# Patient Record
Sex: MALE | Race: WHITE
[De-identification: names, ages, dates, MRNs, and addresses within clinical notes are randomized per-mention and may not be internally consistent; named-entity substitution may affect disease eponyms.]

---

## 2017-11-13 ENCOUNTER — HOSPITAL ENCOUNTER (EMERGENCY)
Dept: HOSPITAL 75 - ER | Age: 82
LOS: 1 days | Discharge: HOME | End: 2017-11-14
Payer: MEDICARE

## 2017-11-13 VITALS — WEIGHT: 145 LBS | HEIGHT: 67 IN | BODY MASS INDEX: 22.76 KG/M2

## 2017-11-13 DIAGNOSIS — I88.0: Primary | ICD-10-CM

## 2017-11-13 DIAGNOSIS — M19.90: ICD-10-CM

## 2017-11-13 DIAGNOSIS — Z98.1: ICD-10-CM

## 2017-11-13 DIAGNOSIS — Z87.891: ICD-10-CM

## 2017-11-13 DIAGNOSIS — E11.9: ICD-10-CM

## 2017-11-13 DIAGNOSIS — N40.0: ICD-10-CM

## 2017-11-13 LAB
BASOPHILS # BLD AUTO: 0.1 10^3/UL (ref 0–0.1)
BASOPHILS NFR BLD AUTO: 1 % (ref 0–10)
EOSINOPHIL # BLD AUTO: 0.3 10^3/UL (ref 0–0.3)
EOSINOPHIL NFR BLD AUTO: 5 % (ref 0–10)
ERYTHROCYTE [DISTWIDTH] IN BLOOD BY AUTOMATED COUNT: 11.7 % (ref 10–14.5)
LYMPHOCYTES # BLD AUTO: 2.3 X 10^3 (ref 1–4)
LYMPHOCYTES NFR BLD AUTO: 37 % (ref 12–44)
MCH RBC QN AUTO: 34 PG (ref 25–34)
MCHC RBC AUTO-ENTMCNC: 35 G/DL (ref 32–36)
MCV RBC AUTO: 97 FL (ref 80–99)
MONOCYTES # BLD AUTO: 0.8 X 10^3 (ref 0–1)
MONOCYTES NFR BLD AUTO: 13 % (ref 0–12)
NEUTROPHILS # BLD AUTO: 2.8 X 10^3 (ref 1.8–7.8)
NEUTROPHILS NFR BLD AUTO: 44 % (ref 42–75)
PLATELET # BLD: 191 10^3/UL (ref 130–400)
PMV BLD AUTO: 10.1 FL (ref 7.4–10.4)
RBC # BLD AUTO: 4.03 10^6/UL (ref 4.35–5.85)
WBC # BLD AUTO: 6.2 10^3/UL (ref 4.3–11)

## 2017-11-13 PROCEDURE — 36415 COLL VENOUS BLD VENIPUNCTURE: CPT

## 2017-11-13 PROCEDURE — 80053 COMPREHEN METABOLIC PANEL: CPT

## 2017-11-13 PROCEDURE — 74176 CT ABD & PELVIS W/O CONTRAST: CPT

## 2017-11-13 PROCEDURE — 87449 NOS EACH ORGANISM AG IA: CPT

## 2017-11-13 PROCEDURE — 83690 ASSAY OF LIPASE: CPT

## 2017-11-13 PROCEDURE — 87046 STOOL CULTR AEROBIC BACT EA: CPT

## 2017-11-13 PROCEDURE — 85025 COMPLETE CBC W/AUTO DIFF WBC: CPT

## 2017-11-13 PROCEDURE — 89055 LEUKOCYTE ASSESSMENT FECAL: CPT

## 2017-11-13 PROCEDURE — 81000 URINALYSIS NONAUTO W/SCOPE: CPT

## 2017-11-13 PROCEDURE — 87324 CLOSTRIDIUM AG IA: CPT

## 2017-11-13 PROCEDURE — 87045 FECES CULTURE AEROBIC BACT: CPT

## 2017-11-13 PROCEDURE — 74022 RADEX COMPL AQT ABD SERIES: CPT

## 2017-11-13 PROCEDURE — 82150 ASSAY OF AMYLASE: CPT

## 2017-11-14 VITALS — DIASTOLIC BLOOD PRESSURE: 85 MMHG | SYSTOLIC BLOOD PRESSURE: 151 MMHG

## 2017-11-14 LAB
ALBUMIN SERPL-MCNC: 3.7 GM/DL (ref 3.2–4.5)
ALT SERPL-CCNC: 23 U/L (ref 0–55)
AMYLASE SERPL-CCNC: 93 U/L (ref 25–125)
ANION GAP SERPL CALC-SCNC: 10 MMOL/L (ref 5–14)
AST SERPL-CCNC: 29 U/L (ref 5–34)
BILIRUB SERPL-MCNC: 0.3 MG/DL (ref 0.1–1)
BILIRUB UR QL STRIP: NEGATIVE
BUN SERPL-MCNC: 22 MG/DL (ref 7–18)
BUN/CREAT SERPL: 20
CALCIUM SERPL-MCNC: 9.1 MG/DL (ref 8.5–10.1)
CAOX CRY #/AREA URNS LPF: (no result) /LPF
CHLORIDE SERPL-SCNC: 109 MMOL/L (ref 98–107)
CO2 SERPL-SCNC: 22 MMOL/L (ref 21–32)
CREAT SERPL-MCNC: 1.11 MG/DL (ref 0.6–1.3)
GFR SERPLBLD BASED ON 1.73 SQ M-ARVRAT: > 60 ML/MIN
GLUCOSE SERPL-MCNC: 116 MG/DL (ref 70–105)
KETONES UR QL STRIP: (no result)
LEUKOCYTE ESTERASE UR QL STRIP: (no result)
LIPASE SERPL-CCNC: 37 U/L (ref 8–78)
NITRITE UR QL STRIP: NEGATIVE
PH UR STRIP: 5 [PH] (ref 5–9)
POTASSIUM SERPL-SCNC: 3.4 MMOL/L (ref 3.6–5)
PROT SERPL-MCNC: 6.2 GM/DL (ref 6.4–8.2)
PROT UR QL STRIP: (no result)
SODIUM SERPL-SCNC: 141 MMOL/L (ref 135–145)
SP GR UR STRIP: 1.03 (ref 1.02–1.02)
SQUAMOUS #/AREA URNS HPF: (no result) /HPF
UROBILINOGEN UR-MCNC: NORMAL MG/DL
WBC #/AREA URNS HPF: (no result) /HPF

## 2017-11-14 NOTE — DIAGNOSTIC IMAGING REPORT
PROCEDURE: CT abdomen and pelvis without contrast.



TECHNIQUE: Multiple contiguous axial images were obtained through

the abdomen and pelvis without the use of intravenous contrast. 



INDICATION: Diarrhea. Abdominal pain. 



COMPARISON: CT abdomen and pelvis with IV contrast 10/23/2013.



FINDINGS: Mild linear atelectasis or scarring in the lung bases.

Cholecystectomy. Enlarged prostate containing calcifications.

Advanced colonic diverticulosis. No evidence of active

diverticulitis. Nonspecific prominent lymph nodes in the central

mesentery with mild stranding. This is similar to the 10/23/2013

exam. Small esophageal hiatal hernia. The liver, pancreas,

spleen, adrenals, kidneys, collecting systems and bladder are

unremarkable on this noncontrast exam. Negative appendix.

Moderate atherosclerotic calcifications including a normal

caliber abdominal aorta. Small fat containing umbilical hernia.

Moderate spondylotic changes in the visualized spine. No acute

osseous findings.



IMPRESSION: 

1. No acute CT findings in the abdomen or pelvis.

2. Advanced colonic diverticulosis without evidence of active

diverticulitis.

3. Melodie mesentery and nonspecific prominent lymph nodes in the

central mesentery has not appreciably changed since the 2013 exam

and is likely incidental.

4. Markedly enlarged prostate.



Dictated by: 



  Dictated on workstation # [id number]

## 2017-11-14 NOTE — DIAGNOSTIC IMAGING REPORT
EXAM: ACUTE ABD SERIES



INDICATION: Diarrhea. Abdominal pain and cramping.



COMPARISON: Chest radiograph 6/30/2015.    



FINDINGS: Normal heart size and pulmonary vascularity. Calcified

aorta. No dense consolidation, pleural effusion or pneumothorax.

Postoperative changes in the shoulders. No free intraperitoneal

air. Nonspecific bowel gas pattern. No acute osseous findings.



IMPRESSION: No acute radiographic findings in the chest or

abdomen.



Dictated by: 



  Dictated on workstation # [id number]

## 2017-11-14 NOTE — ED GI
General


Chief Complaint:  Abdominal/GI Problems


Stated Complaint:  DIARRHEA


Nursing Triage Note:  


PT STATES HE HAS HAD DIARRHEA X 3 WEEKS. WITH INTERMITTENT ABD PAIN AND 


CRAMPING. PT IS ON ANTIBIOTIC RIGHT NOW FOR COUGH.


Sepsis Screen:  No Definite Risk


Source of Information:  Patient





History of Present Illness


Time Seen By Provider:  23:35


Initial Comments


PT ARRIVES VIA POV FROM HOME


C/O DIARRHEA X 3 WEEKS--STATES HE HAS HAD DIARRHEA X 15 SINCE 1900 TONIGHT


HAS MILD LOWER ABDOMINAL CRAMPING IMMEDIATELY PRIOR TO HAVING DIARRHEA, 

OTHERWISE NO ABDOMINAL PAIN


NO NAUSEA/VOMITING OR CHANGE IN APPETITE


NO FEVER


NO URINARY SYMPTOMS AND VOIDING A NORMAL AMOUNT





PT HAD RIGHT SHOULDER SURGERY 3 WEEKS AGO BY DR. REYNOSO IN [place]


PT STATES HE BEGAN HAVING A COUGH/ COLD SYMPTOMS PRIOR TO SURGERY, AND WAS 

PRESCRIBED AMOXIL BY DR. NEELY'S OFFICE


DIARRHEA BEGAN 3 DAYS AFTER SURGERY--DID NOT HAVE A BM AT ALL FOR THE FIRST 3 

DAYS FOLLOWING SURGERY


COUGH HAS PERSISTED AND WAS SEEN AT URGENT CARE/DR. NEELY'S OFFICE 10 DAYS AGO 

AND WAS PRESCRIBED A "REALLY STRONG" ANTIBIOTIC THAT HE TAKES ONCE A DAY. HAS 5 

DAYS LEFT, AND  MISSED ONE DAY. DOES NOT KNOW NAME OF ANTIBIOTIC. STATES COUGH 

IS GETTING BETTER


STATES OVER A WEEK AGO, HE HAD DIARRHEA CONTINUOUSLY FROM 1900 TO 0300, THEN 

WAS FINE FOR A FEW DAYS, WITH A COUPLE OF MORE NORMAL STOOLS, THEN DIARRHEA 

STARTED AGAIN A COUPLE OF DAYS AGO. 





HAS NOT SOUGHT CARE FOR THE DIARRHEA AT ANY TIME


SYMPTOMS ARE NOT ANY DIFFERENT TODAY THAN WHAT HE HAS BEEN EXPERIENCING--NO 

BLOODY OR TARRY STOOLS





PCP: DR. NEELY





Allergies and Home Medications


Allergies


Uncoded Allergies:  


     BEE STING (Allergy, Intermediate, SWELLING OF FACE, 10/2/11)





Home Medications


Ascorbate Calcium 500 Mg Tablet, 1,000 MG PO DAILY, (Reported)


Ciprofloxacin HCl 500 Mg Tablet, 500 MG PO BID, #20


   Prescribed by: GONZALEZ ECKERT on 11/14/17 0151


Lactobacillus Acidophilus 1 Each Capsule, 2 EACH PO QID, #80


   Prescribed by: GONZALEZ ECKERT on 11/14/17 0151


Metronidazole 500 Mg Tablet, 500 MG PO QID, #40


   Prescribed by: GONZALEZ ECKERT on 11/14/17 0151


Mu-Vits-Min Th/Lycopene/Lutein 1 Each Tablet, 1 EACH PO DAILY, (Reported)


[Potassium]  , PRN, (Reported)





Review of Systems


Constitutional:  no symptoms reported


EENTM:  See HPI, Nose Congestion


Respiratory:  Cough


Cardiovascular:  No Symptoms Reported


Gastrointestinal:  See HPI, Diarrhea, Denies Nausea, Denies Poor Appetite, 

Denies Poor Fluid Intake, Denies Rectal Bleeding, Denies Vomiting


Genitourinary:  No Symptoms Reported


Musculoskeletal:  see HPI


Skin:  no symptoms reported


Psychiatric/Neurological:  No Symptoms Reported


Endocrine:  No Symptoms Reported


Hematologic/Lymphatic:  No Symptoms Reported





Past Medical-Social-Family Hx


Patient Social History


Alcohol Use:  Denies Use


Recreational Drug Use:  No


Smoking Status:  Former Smoker (QUIT AGE 10)


Recent Foreign Travel:  No


Contact w/Someone Who Travel:  No


Recent Infectious Disease Expo:  No





Immunizations Up To Date


Tetanus Booster (TDap):  Unknown


Date of Pneumonia Vaccine:  Oct 1, 2010


Date of Influenza Vaccine:  Oct 7, 2013





Surgeries


History of Surgeries:  Yes (LIPOMA LEFT LEG; BILATERAL KNEE SURGERIES; 

BILATERAL FOOT SURGERIES; BILATERAL SHOULDER SURGERIES; BACK AND NECK SURGERIES)


Surgeries:  Gallbladder, Orthopedic





Respiratory


History of Respiratory Disorde:  No





Cardiovascular


History of Cardiac Disorders:  No





Neurological


History of Neurological Disord:  No





Reproductive System


Hx Reproductive Disorders:  No





Genitourinary


History of Genitourinary Disor:  Yes


Genitourinary Disorders:  Benign Prostatic Hyperpl





Gastrointestinal


History of Gastrointestinal Di:  No





Musculoskeletal


History of Musculoskeletal Dis:  Yes (bilateral rotator cuff repair, bilateral 

meniscus repair, C7 fusion; BACK SURGERY)


Musculoskeletal Disorders:  Arthritis





Endocrine


History of Endocrine Disorders:  Yes (DIET CONTORLLED DIABETES)


Endocrine Disorders:  Diabetes, Non-Insulin dep





HEENT


History of HEENT Disorders:  No





Cancer


History of Cancer:  No





Psychosocial


History of Psychiatric Problem:  No





Integumentary


History of Skin or Integumenta:  No





Blood Transfusions


History of Blood Disorders:  No


Adverse Reaction to a Blood Tr:  No





Family Medical History


Significant Family History:  No Pertinent Family Hx





Physical Exam


Vital Signs





 VS - Last 72 Hours, by Label








 11/13/17





 23:24


 


Temp 98.2


 


Pulse 73


 


Resp 16


 


B/P (MAP) 159/83


 


Pulse Ox 96





Capillary Refill : Less Than 3 Seconds


General Appearance:  WD/WN, no apparent distress, other (LEFT ARM IN SLING)


HEENT:  PERRL/EOMI, normal ENT inspection, other (ORAL MUCOSA MOIST)


Respiratory:  normal breath sounds, no respiratory distress, no accessory 

muscle use


Cardiovascular:  normal peripheral pulses, regular rate, rhythm, no murmur


Gastrointestinal:  normal bowel sounds, non tender, soft, no organomegaly


Extremities:  normal inspection, normal capillary refill


Back:  no CVA tenderness


Neurologic/Psychiatric:  CNs II-XII nml as tested, no motor/sensory deficits, 

alert, normal mood/affect, oriented x 3


Skin:  normal color, warm/dry





Progress/Results/Core Measures


Results/Orders


Lab Results





Laboratory Tests








Test


  11/13/17


23:54 11/13/17


23:58 Range/Units


 


 


White Blood Count


  6.2 


  


  4.3-11.0


10^3/uL


 


Red Blood Count


  4.03 L


  


  4.35-5.85


10^6/uL


 


Hemoglobin 13.6   13.3-17.7  G/DL


 


Hematocrit 39 L  40-54  %


 


Mean Corpuscular Volume 97   80-99  FL


 


Mean Corpuscular Hemoglobin 34   25-34  PG


 


Mean Corpuscular Hemoglobin


Concent 35 


  


  32-36  G/DL


 


 


Red Cell Distribution Width 11.7   10.0-14.5  %


 


Platelet Count


  191 


  


  130-400


10^3/uL


 


Mean Platelet Volume 10.1   7.4-10.4  FL


 


Neutrophils (%) (Auto) 44   42-75  %


 


Lymphocytes (%) (Auto) 37   12-44  %


 


Monocytes (%) (Auto) 13 H  0-12  %


 


Eosinophils (%) (Auto) 5   0-10  %


 


Basophils (%) (Auto) 1   0-10  %


 


Neutrophils # (Auto) 2.8   1.8-7.8  X 10^3


 


Lymphocytes # (Auto) 2.3   1.0-4.0  X 10^3


 


Monocytes # (Auto) 0.8   0.0-1.0  X 10^3


 


Eosinophils # (Auto)


  0.3 


  


  0.0-0.3


10^3/uL


 


Basophils # (Auto)


  0.1 


  


  0.0-0.1


10^3/uL


 


Sodium Level 141   135-145  MMOL/L


 


Potassium Level 3.4 L  3.6-5.0  MMOL/L


 


Chloride Level 109 H    MMOL/L


 


Carbon Dioxide Level 22   21-32  MMOL/L


 


Anion Gap 10   5-14  MMOL/L


 


Blood Urea Nitrogen 22 H  7-18  MG/DL


 


Creatinine


  1.11 


  


  0.60-1.30


MG/DL


 


Estimat Glomerular Filtration


Rate > 60 


  


   


 


 


BUN/Creatinine Ratio 20    


 


Glucose Level 116 H    MG/DL


 


Calcium Level 9.1   8.5-10.1  MG/DL


 


Total Bilirubin 0.3   0.1-1.0  MG/DL


 


Aspartate Amino Transf


(AST/SGOT) 29 


  


  5-34  U/L


 


 


Alanine Aminotransferase


(ALT/SGPT) 23 


  


  0-55  U/L


 


 


Alkaline Phosphatase 103     U/L


 


Total Protein 6.2 L  6.4-8.2  GM/DL


 


Albumin 3.7   3.2-4.5  GM/DL


 


Amylase Level 93     U/L


 


Lipase 37   8-78  U/L


 


Urine Color  YELLOW   


 


Urine Clarity  CLEAR   


 


Urine pH  5  5-9  


 


Urine Specific Gravity  1.030 H 1.016-1.022  


 


Urine Protein  1+ H NEGATIVE  


 


Urine Glucose (UA)  NEGATIVE  NEGATIVE  


 


Urine Ketones  1+ H NEGATIVE  


 


Urine Nitrite  NEGATIVE  NEGATIVE  


 


Urine Bilirubin  NEGATIVE  NEGATIVE  


 


Urine Urobilinogen  NORMAL  NORMAL  MG/DL


 


Urine Leukocyte Esterase  3+ H NEGATIVE  


 


Urine RBC (Auto)  NEGATIVE  NEGATIVE  


 


Urine RBC  NONE   /HPF


 


Urine WBC  0-2   /HPF


 


Urine Squamous Epithelial


Cells 


  0-2 


   /HPF


 


 


Urine Crystals  PRESENT H  /LPF


 


Urine Calcium Oxalate Crystals  FEW H  /LPF


 


Urine Bacteria  TRACE   /HPF


 


Urine Casts  NONE   /LPF


 


Urine Mucus  MODERATE H  /LPF


 


Urine Culture Indicated  NO   








My Orders





Orders - GONZALEZ ECKERT DO


Saline Lock/Iv-Start (11/13/17 23:34)


Amylase (11/13/17 23:34)


Cbc With Automated Diff (11/13/17 23:34)


Comprehensive Metabolic Panel (11/13/17 23:34)


Lipase (11/13/17 23:34)


Ua Culture If Indicated (11/13/17 23:34)


Saline Lock/Iv-Start (11/13/17 23:34)


Lactated Ringers (Lr 1000 Ml Iv Solution (11/13/17 23:34)


Ct Abdomen/Pelvis Wo (11/14/17 00:01)


Acute Abd Series (11/14/17 00:01)


Levofloxacin  Tablet (Levaquin  Tablet) (11/14/17 00:45)


Metronidazole 500mg/100ml Ivpb (Flagyl 5 (11/14/17 00:45)


Stool Culture (11/14/17 01:25)


Fecal Wbc (11/14/17 01:25)


C Difficile Ag + Toxin A/B. (11/14/17 01:25)





Medications Given in ED





Current Medications








 Medications  Dose


 Ordered  Sig/Sean


 Route  Start Time


 Stop Time Status Last Admin


Dose Admin


 


 Lactated Ringer's  1,000 ml @ 


 0 mls/hr  Q0M ONCE


 IV  11/13/17 23:34


 11/13/17 23:35 DC 11/13/17 23:56


1,000 MLS/HR


 


 Levofloxacin  500 mg  ONCE  ONCE


 PO  11/14/17 00:45


 11/14/17 00:46 DC 11/14/17 00:49


500 MG


 


 Metronidazole  100 ml @ 


 100 mls/hr  ONCE  ONCE


 IV  11/14/17 00:45


 11/14/17 01:44 DC 11/14/17 00:49


100 MLS/HR








Vital Signs/I&O





Vital Sign - Last 12Hours








 11/13/17





 23:24


 


Temp 98.2


 


Pulse 73


 


Resp 16


 


B/P (MAP) 159/83


 


Pulse Ox 96














Blood Pressure Mean:  108








Progress Note :  


Progress Note


NO PAIN DURING ER STAY


DID HAVE 1 STOOL DURING STAY--SENT TO LAB FOR STUDIES





Diagnostic Imaging





Comments


CT ABDOMEN/PELVIS--DIVERTICULAR DISEASE WITHOUT ACUTE DIVERTICULITIS, NON-

SPECIFIC MESENTERIC LYMPH NODES. NO BOWEL MUCOSAL THICKENING, ENLARGED PROSTATE

, SMALL NIKIA-UMBILICAL HERNIA/FAT CONTAINING--PER [facility] VIA FAX @ 1582


   Reviewed:  Reviewed by Me





Departure


Impression


Impression:  


 Primary Impression:  


 Diarrhea


 Additional Impressions:  


 Mesenteric adenitis


 POSSIBLE ANTIBIOTIC ASSOCIATED DIARRHEA/C.DIFFICILE


Disposition:  01 HOME, SELF-CARE


Condition:  Improved





Departure-Patient Inst.


Referrals:  


MAGALY NEELY MD (PCP/Family)


Primary Care Physician


Patient Instructions:  Diarrhea in Adolescents and Adults, GASTROENTERITIS-6Y-

ADULT, Mesenteric Lymphadenitis (DC)





Add. Discharge Instructions:  


CLEAR LIQUIDS--WATER, BROTH, JELLO, GATORADE





BRATS DIET--BANANAS, RICE, APPLESAUCE, TOAST, SALTINES





FOLLOW UP WITH YOUR DR THIS WEEK FOR FURTHER CARE





All discharge instructions reviewed with patient and/or family. Voiced 

understanding.


Scripts


Lactobacillus Acidophilus (Acidophilus) 1 Each Capsule


2 EACH PO QID, #80 CAP


   Prov: GONZALEZ ECKERT DO         11/14/17 


Metronidazole (Flagyl) 500 Mg Tablet


500 MG PO QID for FOR INFECTION, #40 TAB


   Prov: TOMEKASOLOMONA K DO         11/14/17 


Ciprofloxacin HCl (Cipro) 500 Mg Tablet


500 MG PO BID, #20 TAB


   Prov: GONZALEZ ECKERT DO         11/14/17











GONZALEZ ECKERT DO Nov 14, 2017 00:55

## 2018-09-08 ENCOUNTER — HOSPITAL ENCOUNTER (EMERGENCY)
Dept: HOSPITAL 75 - ER | Age: 83
LOS: 1 days | Discharge: LEFT BEFORE DISCHARGE | End: 2018-09-09
Payer: MEDICARE

## 2018-09-08 ENCOUNTER — HOSPITAL ENCOUNTER (EMERGENCY)
Dept: HOSPITAL 75 - ER | Age: 83
Discharge: HOME | End: 2018-09-08
Payer: MEDICARE

## 2018-09-08 VITALS — DIASTOLIC BLOOD PRESSURE: 68 MMHG | SYSTOLIC BLOOD PRESSURE: 134 MMHG

## 2018-09-08 VITALS — BODY MASS INDEX: 21.98 KG/M2 | WEIGHT: 145 LBS | HEIGHT: 68 IN

## 2018-09-08 VITALS — DIASTOLIC BLOOD PRESSURE: 61 MMHG | SYSTOLIC BLOOD PRESSURE: 131 MMHG

## 2018-09-08 DIAGNOSIS — Z90.49: ICD-10-CM

## 2018-09-08 DIAGNOSIS — R10.9: Primary | ICD-10-CM

## 2018-09-08 DIAGNOSIS — Z87.448: ICD-10-CM

## 2018-09-08 DIAGNOSIS — R10.33: ICD-10-CM

## 2018-09-08 DIAGNOSIS — K59.00: Primary | ICD-10-CM

## 2018-09-08 DIAGNOSIS — E11.9: ICD-10-CM

## 2018-09-08 DIAGNOSIS — R07.9: ICD-10-CM

## 2018-09-08 LAB
ALBUMIN SERPL-MCNC: 3.7 GM/DL (ref 3.2–4.5)
ALP SERPL-CCNC: 81 U/L (ref 40–136)
ALT SERPL-CCNC: 13 U/L (ref 0–55)
BASOPHILS # BLD AUTO: 0 10^3/UL (ref 0–0.1)
BASOPHILS NFR BLD AUTO: 0 % (ref 0–10)
BILIRUB SERPL-MCNC: 0.8 MG/DL (ref 0.1–1)
BUN/CREAT SERPL: 26
CALCIUM SERPL-MCNC: 8.8 MG/DL (ref 8.5–10.1)
CHLORIDE SERPL-SCNC: 106 MMOL/L (ref 98–107)
CO2 SERPL-SCNC: 21 MMOL/L (ref 21–32)
CREAT SERPL-MCNC: 1.1 MG/DL (ref 0.6–1.3)
EOSINOPHIL # BLD AUTO: 0.3 10^3/UL (ref 0–0.3)
EOSINOPHIL NFR BLD AUTO: 3 % (ref 0–10)
ERYTHROCYTE [DISTWIDTH] IN BLOOD BY AUTOMATED COUNT: 12.2 % (ref 10–14.5)
GFR SERPLBLD BASED ON 1.73 SQ M-ARVRAT: > 60 ML/MIN
GLUCOSE SERPL-MCNC: 272 MG/DL (ref 70–105)
HCT VFR BLD CALC: 40 % (ref 40–54)
HGB BLD-MCNC: 14 G/DL (ref 13.3–17.7)
LYMPHOCYTES # BLD AUTO: 1.2 X 10^3 (ref 1–4)
LYMPHOCYTES NFR BLD AUTO: 12 % (ref 12–44)
MANUAL DIFFERENTIAL PERFORMED BLD QL: NO
MCH RBC QN AUTO: 34 PG (ref 25–34)
MCHC RBC AUTO-ENTMCNC: 35 G/DL (ref 32–36)
MCV RBC AUTO: 100 FL (ref 80–99)
MONOCYTES # BLD AUTO: 0.6 X 10^3 (ref 0–1)
MONOCYTES NFR BLD AUTO: 6 % (ref 0–12)
NEUTROPHILS # BLD AUTO: 7.8 X 10^3 (ref 1.8–7.8)
NEUTROPHILS NFR BLD AUTO: 79 % (ref 42–75)
PLATELET # BLD: 195 10^3/UL (ref 130–400)
PMV BLD AUTO: 11 FL (ref 7.4–10.4)
POTASSIUM SERPL-SCNC: 3.9 MMOL/L (ref 3.6–5)
PROT SERPL-MCNC: 6.1 GM/DL (ref 6.4–8.2)
RBC # BLD AUTO: 4.06 10^6/UL (ref 4.35–5.85)
SODIUM SERPL-SCNC: 137 MMOL/L (ref 135–145)
WBC # BLD AUTO: 9.9 10^3/UL (ref 4.3–11)

## 2018-09-08 PROCEDURE — 74177 CT ABD & PELVIS W/CONTRAST: CPT

## 2018-09-08 PROCEDURE — 80053 COMPREHEN METABOLIC PANEL: CPT

## 2018-09-08 PROCEDURE — 85025 COMPLETE CBC W/AUTO DIFF WBC: CPT

## 2018-09-08 PROCEDURE — 36415 COLL VENOUS BLD VENIPUNCTURE: CPT

## 2018-09-08 PROCEDURE — 93005 ELECTROCARDIOGRAM TRACING: CPT

## 2018-09-08 PROCEDURE — 84484 ASSAY OF TROPONIN QUANT: CPT

## 2018-09-08 PROCEDURE — 71045 X-RAY EXAM CHEST 1 VIEW: CPT

## 2018-09-08 NOTE — ED ABDOMINAL PAIN
General


Chief Complaint:  Abdominal/GI Problems


Stated Complaint:  SHARP ABD PAIN,MOVING TOWARDS CHEST,HEADACHE


Nursing Triage Note:  


ARRIVED VIA AMB TO ROOM 07.  STATES YESTERDAY HE STARTED HAVING SHOOTING ABD 


PAIN THAT IS NOW RADIATING INTO CHEST THIS AM.


Sepsis Screen:  No Definite Risk


Source of Information:  Patient


Exam Limitations:  No Limitations





History of Present Illness


Date Seen by Provider:  Sep 8, 2018


Time Seen by Provider:  11:39


Initial Comments


The patient is an 86-year-old white male known to me.  He reports that 

yesterday afternoon he began having a rather sharp pain in his abdomen between 

the umbilicus and the xiphoid process.  There was discomfort and he forced 

belching through the night.  He normally has a morning bowel movement but has 

not this morning.  The pain has seemed to move upward some what into the low 

chest.  There is no radiation to the shoulders or the scapulae.  He has had a 

previous cholecystectomy.  There has been some nausea but he has not felt as if 

he needed to vomit.  He ate some breakfast this morning but reports that may 

have been a mistake.


Timing/Duration:  12-24 Hours


Severity/Quality:  Mild, Moderate


Location:  RUQ, Periumbilical


Radiation:  Chest





Allergies and Home Medications


Allergies


Uncoded Allergies:  


     BEE STING (Allergy, Intermediate, SWELLING OF FACE, 10/2/11)





Home Medications


Ascorbate Calcium 500 Mg Tablet, 1,000 MG PO DAILY, (Reported)


Ciprofloxacin HCl 500 Mg Tablet, 500 MG PO BID


   Prescribed by: GONZALEZ ECKERT on 11/14/17 0151


Lactobacillus Acidophilus 1 Each Capsule, 2 EACH PO QID


   Prescribed by: GONZALEZ ECKERT on 11/14/17 0151


Metronidazole 500 Mg Tablet, 500 MG PO QID


   Prescribed by: GONZALEZ ECKERT on 11/14/17 0151


Mu-Vits-Min Th/Lycopene/Lutein 1 Each Tablet, 1 EACH PO DAILY, (Reported)


[Potassium]  , PRN, (Reported)





Patient Home Medication List


Home Medication List Reviewed:  Yes





Review of Systems


Review of Systems


Constitutional:  see HPI


EENTM:  No Symptoms Reported


Respiratory:  No Symptoms Reported


Cardiovascular:  No Symptoms Reported


Gastrointestinal:  See HPI, Abdomen Distended


Genitourinary:  No Symptoms Reported


Musculoskeletal:  no symptoms reported


Skin:  no symptoms reported


Psychiatric/Neurological:  No Symptoms Reported


Endocrine:  No Symptoms Reported


Hematologic/Lymphatic:  No Symptoms Reported





Past Medical-Social-Family Hx


Patient Social History


Alcohol Use:  Denies Use


Recreational Drug Use:  No


Smoking Status:  Never a Smoker


Recent Foreign Travel:  No


Contact w/Someone Who Travel:  No


Recent Infectious Disease Expo:  No





Immunizations Up To Date


Tetanus Booster (TDap):  Unknown


Date of Pneumonia Vaccine:  Oct 1, 2010


Date of Influenza Vaccine:  Oct 7, 2013





Past Medical History


Surgeries:  Yes


Gallbladder, Orthopedic


Respiratory:  No


Cardiac:  No


Neurological:  No


Reproductive Disorders:  No


Genitourinary:  Yes


Benign Prostatic Hyperpl


Gastrointestinal:  No


Musculoskeletal:  Yes


Arthritis


Endocrine:  Yes (DIET CONTORLLED DIABETES)


Diabetes, Non-Insulin dep


HEENT:  No


Cancer:  No


Psychosocial:  No


Integumentary:  No


Blood Disorders:  No


Adverse Reaction/Blood Tranf:  No





Family Medical History


No Pertinent Family Hx





Physical Exam


Vital Signs





Vital Signs - First Documented








 9/8/18





 10:45


 


Temp 98.0


 


Pulse 68


 


Resp 16


 


B/P (MAP) 135/70 (91)


 


Pulse Ox 97


 


O2 Delivery Room Air





Capillary Refill : Less Than 3 Seconds


Height/Weight/BMI


Height: 5'8.00"


Weight: 145lbs. 13.0oz. [id number];  BMI


Method:Stated


General Appearance:  other (hard of hearing)


HEENT:  normal ENT inspection


Neck:  full range of motion


Respiratory:  chest non-tender, lungs clear, normal breath sounds, no 

respiratory distress, no accessory muscle use


Cardiovascular:  normal peripheral pulses, regular rate, rhythm, no edema, no 

gallop, no JVD, no murmur


Gastrointestinal:  abnormal bowel sounds (decreased), guarding (right 

periumbilical and upper quadrant)


Extremities:  normal range of motion


Back:  normal inspection


Neurologic/Psychiatric:  CNs II-XII nml as tested, no motor/sensory deficits, 

alert, normal mood/affect, oriented x 3


Skin:  normal color, warm/dry


Lymphatic:  no adenopathy





Progress/Results/Core Measures


Results/Orders


Lab Results





Laboratory Tests








Test


 9/8/18


11:04 Range/Units


 


 


White Blood Count


 9.9 


 4.3-11.0


10^3/uL


 


Red Blood Count


 4.06 L


 4.35-5.85


10^6/uL


 


Hemoglobin 14.0  13.3-17.7  G/DL


 


Hematocrit 40  40-54  %


 


Mean Corpuscular Volume 100 H 80-99  FL


 


Mean Corpuscular Hemoglobin 34  25-34  PG


 


Mean Corpuscular Hemoglobin


Concent 35 


 32-36  G/DL





 


Red Cell Distribution Width 12.2  10.0-14.5  %


 


Platelet Count


 195 


 130-400


10^3/uL


 


Mean Platelet Volume 11.0 H 7.4-10.4  FL


 


Neutrophils (%) (Auto) 79 H 42-75  %


 


Lymphocytes (%) (Auto) 12  12-44  %


 


Monocytes (%) (Auto) 6  0-12  %


 


Eosinophils (%) (Auto) 3  0-10  %


 


Basophils (%) (Auto) 0  0-10  %


 


Neutrophils # (Auto) 7.8  1.8-7.8  X 10^3


 


Lymphocytes # (Auto) 1.2  1.0-4.0  X 10^3


 


Monocytes # (Auto) 0.6  0.0-1.0  X 10^3


 


Eosinophils # (Auto)


 0.3 


 0.0-0.3


10^3/uL


 


Basophils # (Auto)


 0.0 


 0.0-0.1


10^3/uL


 


Sodium Level 137  135-145  MMOL/L


 


Potassium Level 3.9  3.6-5.0  MMOL/L


 


Chloride Level 106    MMOL/L


 


Carbon Dioxide Level 21  21-32  MMOL/L


 


Anion Gap 10  5-14  MMOL/L


 


Blood Urea Nitrogen 29 H 7-18  MG/DL


 


Creatinine


 1.10 


 0.60-1.30


MG/DL


 


Estimat Glomerular Filtration


Rate > 60 


  





 


BUN/Creatinine Ratio 26   


 


Glucose Level 272 H   MG/DL


 


Calcium Level 8.8  8.5-10.1  MG/DL


 


Corrected Calcium 9.0  8.5-10.1  MG/DL


 


Total Bilirubin 0.8  0.1-1.0  MG/DL


 


Aspartate Amino Transf


(AST/SGOT) 19 


 5-34  U/L





 


Alanine Aminotransferase


(ALT/SGPT) 13 


 0-55  U/L





 


Alkaline Phosphatase 81    U/L


 


Troponin I < 0.30  <0.30  NG/ML


 


Total Protein 6.1 L 6.4-8.2  GM/DL


 


Albumin 3.7  3.2-4.5  GM/DL








My Orders





Orders - MIKY RICHARDS MD


Cbc With Automated Diff (9/8/18 11:30)


Comprehensive Metabolic Panel (9/8/18 11:30)


Troponin I (9/8/18 11:30)


Chest 1 View, Ap/Pa Only (9/8/18 11:30)


Ekg Tracing (9/8/18 11:30)


Ct Abdomen/Pelvis W (9/8/18 12:06)





Vital Signs/I&O











 9/8/18 9/8/18





 10:45 11:26


 


Temp 98.0 


 


Pulse 68 


 


Resp 16 


 


B/P (MAP) 135/70 (91) 134/68 (90)


 


Pulse Ox 97 


 


O2 Delivery Room Air 














Blood Pressure Mean:  90











Departure


Communication (Admissions)


1342 CT scan of the abdomen shows no discrete pathology.  It is noted that 

there is stool throughout the colon.





Impression





 Primary Impression:  


 constipation


Disposition:  01 HOME, SELF-CARE


Condition:  Stable/Unchanged





Departure-Patient Inst.


Decision time for Depature:  13:43


Referrals:  


MAGALY NEELY MD (PCP/Family)


Primary Care Physician


Patient Instructions:  Constipation, Adult (DC)





Add. Discharge Instructions:  


All discharge instructions reviewed with patient and/or family. Voiced 

understanding.


Take a dose of MiraLAX tonight.  Put the powder in water or juice.  This may be 

repeated twice daily until you have a bowel movement.


Return to ER for further pain or change in condition.











MIKY RICHARDS MD Sep 8, 2018 11:43

## 2018-09-08 NOTE — XMS REPORT
Continuity of Care Document

 Created on: 2018



ERNST NICHOLSON

External Reference #: [id number]

: 1931

Sex: Male



Demographics







 Address  [address] [place]  [address]

 

 Home Phone  [phone] x

 

 Preferred Language  Unknown

 

 Marital Status  Unknown

 

 Jain Affiliation  Unknown

 

 Race  Unknown

 

 Ethnic Group  Unknown





Author







 Author  [facility]

 

 Organization  [facility]

 

 Address  Unknown

 

 Phone  Unavailable



              



Allergies

      





 Active            Description            Code            Type            
Severity            Reaction            Onset            Reported/Identified   
         Relationship to Patient            Clinical Status        

 

 Yes            BEE STING            BEE STING                         Moderate
            SWELLING OF FAC                         10/02/2011                 
                 



                  



Medications

      



There is no data.                  



Problems

      





 Date Dx Coded            Attending            Type            Code            
Diagnosis            Diagnosed By        

 

 10/02/2011                         Ot            922.1                        
          

 

 10/02/2011                         Ot            959.11                       
           

 

 10/02/2011                         Ot            E000.8                       
           

 

 10/02/2011                         Ot            E029.9                       
           

 

 10/02/2011                         Ot            E849.0                       
           

 

 10/02/2011                         Ot            E888.9                       
           

 

 10/24/2013            BRYSON OSORIO, DOMINGO            Ot            250.00        
    DIAB CLAUDIO WO COMPL, TYPE II OR UNSPEC TY                     

 

 10/24/2013            BRYSON OSORIO, DOMINGO            Ot            574.00        
    CHOLELITH W AC CHOLECYST                     

 

 10/24/2013            DOMINGO MASSEY MD            Ot            574.10        
    CHOLELITH W CHOLECYS NEC                     

 

 2015            ELIECER WU MD            Ot            250.00  
          DIAB CLAUDIO WO COMPL, TYPE II OR UNSPEC TY                     

 

 2015            ELIECER WU MD            Ot            780.4   
         DIZZINESS AND GIDDINESS                     

 

 2015            ELIECER WU MD            Ot            784.0   
         HEADACHE                     

 

 2015            ELIECER WU MD            Ot            790.99  
          BLOOD EXAM - OTH NONSPECIFIC FINDINGS                     

 

 2015            CHIN OSORIO, MAGALY R            Ot            435.9       
                           

 

 2016            MAGALY NEELY MD R            Ot            435.9       
                           

 

 2016            MAGALY NEELY MD R            Ot            M25.551     
                             

 

 2016            MAGALY NEELY MD R            Ot            M25.552     
                             

 

 2017            MAGALY NEELY MD            Ot            435.9       
     TRANS CEREB ISCHEMIA NOS                     

 

 2017            MAGALY NEELY MD R            Ot            M25.551     
       PAIN IN RIGHT HIP                     

 

 2017            MAGALY NEELY MD            Ot            M25.552     
       PAIN IN LEFT HIP                     

 

 2017            MAGALY NEELY MD, Ot            M54.17      
      RADICULOPATHY, LUMBOSACRAL REGION                     

 

 2017            MAGALY NEELY MD            Ot            M25.551     
       PAIN IN RIGHT HIP                     

 

 2017            MAGALY NEELY MD            Ot            M25.552     
       PAIN IN LEFT HIP                     

 

 2017            TOMEKA DO, GONZALEZ K            Ot            E11.9          
  TYPE 2 DIABETES MELLITUS WITHOUT COMPLIC                     

 

 2017            TOMEKA DO, GONZALEZ K            Ot            I88.0          
  NONSPECIFIC MESENTERIC LYMPHADENITIS                     

 

 2017            TOMEKA DO, GONZALEZ K            Ot            M19.90         
   UNSPECIFIED OSTEOARTHRITIS, UNSPECIFIED                      

 

 2017            TOMEKA DO, GONZALEZ K            Ot            N40.0          
  BENIGN PROSTATIC HYPERPLASIA WITHOUT LOW                     

 

 2017            TOMEKA DO GONZALEZ K            Ot            R19.7          
  DIARRHEA, UNSPECIFIED                     

 

 2017            TOMEKA DO GONZALEZ K            Ot            Z87.891        
    PERSONAL HISTORY OF NICOTINE DEPENDENCE                     

 

 2017            TOMEKA SOLOMON LOOMISA K            Ot            Z98.1          
  ARTHRODESIS STATUS                     

 

 2017            MAGALY NEELY MD            Ot            435.9       
     TRANS CEREB ISCHEMIA NOS                     

 

 2017            MAGALY NEELY MD            Ot            M25.551     
       PAIN IN RIGHT HIP                     

 

 2017            MAGALY NEELY MD            Ot            M25.552     
       PAIN IN LEFT HIP                     

 

 2017            MAGALY NEELY MD, Ot            M54.17      
      RADICULOPATHY, LUMBOSACRAL REGION                     

 

 2017            MAGALY NEELY MD, Ot            M25.551     
       PAIN IN RIGHT HIP                     

 

 2017            MAGALY NEELY MD, Ot            M25.552     
       PAIN IN LEFT HIP                     



                                                                               
         



Procedures

      





 Code            Description            Performed By            Performed On   
     

 

             51.23                                  LAPAROSCOPIC 
CHOLECYSTECTOMY                                   10/24/2013        



                  



Results

      





 Test            Result            Range        









 Complete blood count (CBC) with automated white blood cell (WBC) differential 
- 17 23:54         









 Blood leukocytes automated count (number/volume)            6.2 10*3/uL       
     4.3-11.0        

 

 Blood erythrocytes automated count (number/volume)            4.03 10*6/uL    
        4.35-5.85        

 

 Venous blood hemoglobin measurement (mass/volume)            13.6 g/dL        
    13.3-17.7        

 

 Blood hematocrit (volume fraction)            39 %            40-54        

 

 Automated erythrocyte mean corpuscular volume            97 [foz_us]          
  80-99        

 

 Automated erythrocyte mean corpuscular hemoglobin (mass per erythrocyte)      
      34 pg            25-34        

 

 Automated erythrocyte mean corpuscular hemoglobin concentration measurement (
mass/volume)            35 g/dL            32-36        

 

 Automated erythrocyte distribution width ratio            11.7 %            
10.0-14.5        

 

 Automated blood platelet count (count/volume)            191 10*3/uL          
  130-400        

 

 Automated blood platelet mean volume measurement            10.1 [foz_us]     
       7.4-10.4        

 

 Automated blood neutrophils/100 leukocytes            44 %            42-75   
     

 

 Automated blood lymphocytes/100 leukocytes            37 %            12-44   
     

 

 Blood monocytes/100 leukocytes            13 %            0-12        

 

 Automated blood eosinophils/100 leukocytes            5 %            0-10     
   

 

 Automated blood basophils/100 leukocytes            1 %            0-10        

 

 Blood neutrophils automated count (number/volume)            2.8 10*3         
   1.8-7.8        

 

 Blood lymphocytes automated count (number/volume)            2.3 10*3         
   1.0-4.0        

 

 Blood monocytes automated count (number/volume)            0.8 10*3            
0.0-1.0        

 

 Automated eosinophil count            0.3 10*3/uL            0.0-0.3        

 

 Automated blood basophil count (count/volume)            0.1 10*3/uL          
  0.0-0.1        









 Comprehensive metabolic panel - 17 23:54         









 Serum or plasma sodium measurement (moles/volume)            141 mmol/L       
     135-145        

 

 Serum or plasma potassium measurement (moles/volume)            3.4 mmol/L    
        3.6-5.0        

 

 Serum or plasma chloride measurement (moles/volume)            109 mmol/L     
               

 

 Carbon dioxide            22 mmol/L            21-32        

 

 Serum or plasma anion gap determination (moles/volume)            10 mmol/L   
         5-14        

 

 Serum or plasma urea nitrogen measurement (mass/volume)            22 mg/dL   
         7-18        

 

 Serum or plasma creatinine measurement (mass/volume)            1.11 mg/dL    
        0.60-1.30        

 

 Serum or plasma urea nitrogen/creatinine mass ratio            20             
NRG        

 

 Serum or plasma creatinine measurement with calculation of estimated 
glomerular filtration rate            >             NRG        

 

 Serum or plasma glucose measurement (mass/volume)            116 mg/dL        
            

 

 Serum or plasma calcium measurement (mass/volume)            9.1 mg/dL        
    8.5-10.1        

 

 Serum or plasma total bilirubin measurement (mass/volume)            0.3 mg/dL
            0.1-1.0        

 

 Serum or plasma alkaline phosphatase measurement (enzymatic activity/volume)  
          103 U/L                    

 

 Serum or plasma aspartate aminotransferase measurement (enzymatic activity/
volume)            29 U/L            5-34        

 

 Serum or plasma alanine aminotransferase measurement (enzymatic activity/volume
)            23 U/L            0-55        

 

 Serum or plasma protein measurement (mass/volume)            6.2 g/dL         
   6.4-8.2        

 

 Serum or plasma albumin measurement (mass/volume)            3.7 g/dL         
   3.2-4.5        









 Serum or plasma amylase measurement (enzymatic activity/volume) - 17 23:
54         









 Serum or plasma amylase measurement (enzymatic activity/volume)            93 U
/L                    









 Lipase - 17 23:54         









 Lipase            37 U/L            8-78        









 Complete urinalysis with reflex to culture - 17 23:58         









 Urine color determination            YELLOW             NRG        

 

 Urine clarity determination            CLEAR             NRG        

 

 Urine pH measurement by test strip            5             5-9        

 

 Specific gravity of urine by test strip            1.030             1.016-
1.022        

 

 Urine protein assay by test strip, semi-quantitative            1+             
NEGATIVE        

 

 Urine glucose detection by automated test strip            NEGATIVE           
  NEGATIVE        

 

 Erythrocytes detection in urine sediment by light microscopy            
NEGATIVE             NEGATIVE        

 

 Urine ketones detection by automated test strip            1+             
NEGATIVE        

 

 Urine nitrite detection by test strip            NEGATIVE             NEGATIVE
        

 

 Urine total bilirubin detection by test strip            NEGATIVE             
NEGATIVE        

 

 Urine urobilinogen measurement by automated test strip (mass/volume)          
  NORMAL             NORMAL        

 

 Urine leukocyte esterase detection by dipstick            3+             
NEGATIVE        

 

 Automated urine sediment erythrocyte count by microscopy (number/high power 
field)            NONE             NRG        

 

 Automated urine sediment leukocyte count by microscopy (number/high power field
)             [HPF]            NRG        

 

 Bacteria detection in urine sediment by light microscopy            TRACE     
        NRG        

 

 Squamous epithelial cells detection in urine sediment by light microscopy     
       0-2             NRG        

 

 Crystals detection in urine sediment by light microscopy            PRESENT   
          NRG        

 

 Casts detection in urine sediment by light microscopy            NONE         
    NRG        

 

 Mucus detection in urine sediment by light microscopy            MODERATE     
        NRG        

 

 Complete urinalysis with reflex to culture            NO             NRG      
  

 

 Calcium oxalate crystals detection in urine sediment by light microscopy      
      FEW             NRG        









 Stool leukocytes detection by light microscopy - 17 00:58         









 FECAL WBC RESULTS            NO WBC'S OBSERVED ON DIRECT SMEAR             NRG
        

 

 FECAL NOTE            FECAL LEUKOCYTES MAY BE INTERMITTENTLY PRESENT OR       
      NRG        

 

 FECAL NOTE            UNEVENLY DISTRIBUTED IN STOOL SPECIMENS, AND WBC        
     NRG        

 

 FECAL NOTE            MORPHOLOGY DEGRADES DURING TRANSPORT             NRG    
    

 

 FECAL NOTE            NOTE:             NRG        









 C DIFFICILE AG + TOXIN A/B. - 17 00:58         









 RESULTS            NEGATIVE FOR ANTIGEN AND TOXIN A/B             NRG        









 Stool bacteria identification by culture - 17 00:58         



                              



Encounters

      





 ACCT No.            Visit Date/Time            Discharge            Status    
        Pt. Type            Provider            Facility            Loc./Unit  
          Complaint        

 

 [id number]            2017 22:56:00            2017 01:52:00    
        DIS            Emergency            TOMEKA  GONZALEZ K            [facility]            ER            [place]        

 

 [id number]            2016 14:20:00            2016 23:59:59    
        CLS            Outpatient            MAGALY NEELY MD            [facility]            RAD            BILATERAL HIP PAIN DOWN 
LEFT LEG, NUMBNESS        

 

 [id number]            2016 07:34:00            2016 23:59:59    
        CLS            Outpatient            MAGALY NEELY MD            [facility]            RAD            BILATERAL HIP PAIN DOWN 
LEFT LEG,NUMBNESS        

 

 [id number]            2015 10:55:00            2015 23:59:59    
        CLS            Outpatient            MAGALY NEELY MD            [facility]            RAD            TIA        

 

 [id number]            2015 12:07:00            2015 15:52:00    
        DIS            Emergency            ELIECER WU MD            
[facility]            ER            COORDINATION ISSUES  
      

 

 [id number]            10/23/2013 19:00:00            10/24/2013 17:30:00    
        DIS            Inpatient            DOMINGO MASSEY MD            [facility]            SURGICAL            CHOLELITHIASIS;
ABDOMINAL PAIN;NIDDM        

 

 [id number]            10/02/2011 10:05:00                                   
   Document Registration                                                       
     

 

 [id number]            2015 10:55:58                         [place]            
Document Registration

## 2018-09-08 NOTE — XMS REPORT
Continuity of Care Document

 Created on: 2018



ERNST NICHOLSON

External Reference #: [id number]

: 1931

Sex: Male



Demographics







 Address  [address] [place]  [address]

 

 Home Phone  [phone] x

 

 Preferred Language  Unknown

 

 Marital Status  Unknown

 

 Voodoo Affiliation  Unknown

 

 Race  Unknown

 

 Ethnic Group  Unknown





Author







 Author  [facility]

 

 Organization  [facility]

 

 Address  Unknown

 

 Phone  Unavailable



              



Allergies

      





 Active            Description            Code            Type            
Severity            Reaction            Onset            Reported/Identified   
         Relationship to Patient            Clinical Status        

 

 Yes            BEE STING            BEE STING                         Moderate
            SWELLING OF FAC                         10/02/2011                 
                 



                  



Medications

      



There is no data.                  



Problems

      





 Date Dx Coded            Attending            Type            Code            
Diagnosis            Diagnosed By        

 

 10/02/2011                         Ot            922.1                        
          

 

 10/02/2011                         Ot            959.11                       
           

 

 10/02/2011                         Ot            E000.8                       
           

 

 10/02/2011                         Ot            E029.9                       
           

 

 10/02/2011                         Ot            E849.0                       
           

 

 10/02/2011                         Ot            E888.9                       
           

 

 10/24/2013            BRYSON OSORIO, DOMINGO            Ot            250.00        
    DIAB CLAUDIO WO COMPL, TYPE II OR UNSPEC TY                     

 

 10/24/2013            BRYSON OSORIO, DOMINGO            Ot            574.00        
    CHOLELITH W AC CHOLECYST                     

 

 10/24/2013            DOMINGO MASSEY MD            Ot            574.10        
    CHOLELITH W CHOLECYS NEC                     

 

 2015            ELIECER WU MD            Ot            250.00  
          DIAB CLAUDIO WO COMPL, TYPE II OR UNSPEC TY                     

 

 2015            ELIECER WU MD            Ot            780.4   
         DIZZINESS AND GIDDINESS                     

 

 2015            ELIECER WU MD            Ot            784.0   
         HEADACHE                     

 

 2015            ELIECER WU MD            Ot            790.99  
          BLOOD EXAM - OTH NONSPECIFIC FINDINGS                     

 

 2015            CHIN OSORIO, MAGALY R            Ot            435.9       
                           

 

 2016            MAGALY NEELY MD R            Ot            435.9       
                           

 

 2016            MAGALY NEELY MD R            Ot            M25.551     
                             

 

 2016            MAGALY NEELY MD R            Ot            M25.552     
                             

 

 2017            MAGALY NEELY MD            Ot            435.9       
     TRANS CEREB ISCHEMIA NOS                     

 

 2017            MAGALY NEELY MD R            Ot            M25.551     
       PAIN IN RIGHT HIP                     

 

 2017            MAGALY NEELY MD            Ot            M25.552     
       PAIN IN LEFT HIP                     

 

 2017            MAGALY NEELY MD, Ot            M54.17      
      RADICULOPATHY, LUMBOSACRAL REGION                     

 

 2017            MAGALY NEELY MD            Ot            M25.551     
       PAIN IN RIGHT HIP                     

 

 2017            MAGALY NEELY MD            Ot            M25.552     
       PAIN IN LEFT HIP                     

 

 2017            TOMEKA DO, GONZALEZ K            Ot            E11.9          
  TYPE 2 DIABETES MELLITUS WITHOUT COMPLIC                     

 

 2017            TOMEKA DO, GONZALEZ K            Ot            I88.0          
  NONSPECIFIC MESENTERIC LYMPHADENITIS                     

 

 2017            TOMEKA DO, GONZALEZ K            Ot            M19.90         
   UNSPECIFIED OSTEOARTHRITIS, UNSPECIFIED                      

 

 2017            TOMEKA DO, GONZALEZ K            Ot            N40.0          
  BENIGN PROSTATIC HYPERPLASIA WITHOUT LOW                     

 

 2017            TOMEKA DO GONZALEZ K            Ot            R19.7          
  DIARRHEA, UNSPECIFIED                     

 

 2017            TOMEKA DO GONZALEZ K            Ot            Z87.891        
    PERSONAL HISTORY OF NICOTINE DEPENDENCE                     

 

 2017            TOMEKA SOLOMON LOOMISA K            Ot            Z98.1          
  ARTHRODESIS STATUS                     

 

 2017            MAGALY NEELY MD            Ot            435.9       
     TRANS CEREB ISCHEMIA NOS                     

 

 2017            MAGALY NEELY MD            Ot            M25.551     
       PAIN IN RIGHT HIP                     

 

 2017            MAGALY NEELY MD            Ot            M25.552     
       PAIN IN LEFT HIP                     

 

 2017            MAGALY NEELY MD, Ot            M54.17      
      RADICULOPATHY, LUMBOSACRAL REGION                     

 

 2017            MAGALY NEELY MD, Ot            M25.551     
       PAIN IN RIGHT HIP                     

 

 2017            MAGALY NEELY MD, Ot            M25.552     
       PAIN IN LEFT HIP                     



                                                                               
         



Procedures

      





 Code            Description            Performed By            Performed On   
     

 

             51.23                                  LAPAROSCOPIC 
CHOLECYSTECTOMY                                   10/24/2013        



                  



Results

      





 Test            Result            Range        









 Complete blood count (CBC) with automated white blood cell (WBC) differential 
- 17 23:54         









 Blood leukocytes automated count (number/volume)            6.2 10*3/uL       
     4.3-11.0        

 

 Blood erythrocytes automated count (number/volume)            4.03 10*6/uL    
        4.35-5.85        

 

 Venous blood hemoglobin measurement (mass/volume)            13.6 g/dL        
    13.3-17.7        

 

 Blood hematocrit (volume fraction)            39 %            40-54        

 

 Automated erythrocyte mean corpuscular volume            97 [foz_us]          
  80-99        

 

 Automated erythrocyte mean corpuscular hemoglobin (mass per erythrocyte)      
      34 pg            25-34        

 

 Automated erythrocyte mean corpuscular hemoglobin concentration measurement (
mass/volume)            35 g/dL            32-36        

 

 Automated erythrocyte distribution width ratio            11.7 %            
10.0-14.5        

 

 Automated blood platelet count (count/volume)            191 10*3/uL          
  130-400        

 

 Automated blood platelet mean volume measurement            10.1 [foz_us]     
       7.4-10.4        

 

 Automated blood neutrophils/100 leukocytes            44 %            42-75   
     

 

 Automated blood lymphocytes/100 leukocytes            37 %            12-44   
     

 

 Blood monocytes/100 leukocytes            13 %            0-12        

 

 Automated blood eosinophils/100 leukocytes            5 %            0-10     
   

 

 Automated blood basophils/100 leukocytes            1 %            0-10        

 

 Blood neutrophils automated count (number/volume)            2.8 10*3         
   1.8-7.8        

 

 Blood lymphocytes automated count (number/volume)            2.3 10*3         
   1.0-4.0        

 

 Blood monocytes automated count (number/volume)            0.8 10*3            
0.0-1.0        

 

 Automated eosinophil count            0.3 10*3/uL            0.0-0.3        

 

 Automated blood basophil count (count/volume)            0.1 10*3/uL          
  0.0-0.1        









 Comprehensive metabolic panel - 17 23:54         









 Serum or plasma sodium measurement (moles/volume)            141 mmol/L       
     135-145        

 

 Serum or plasma potassium measurement (moles/volume)            3.4 mmol/L    
        3.6-5.0        

 

 Serum or plasma chloride measurement (moles/volume)            109 mmol/L     
               

 

 Carbon dioxide            22 mmol/L            21-32        

 

 Serum or plasma anion gap determination (moles/volume)            10 mmol/L   
         5-14        

 

 Serum or plasma urea nitrogen measurement (mass/volume)            22 mg/dL   
         7-18        

 

 Serum or plasma creatinine measurement (mass/volume)            1.11 mg/dL    
        0.60-1.30        

 

 Serum or plasma urea nitrogen/creatinine mass ratio            20             
NRG        

 

 Serum or plasma creatinine measurement with calculation of estimated 
glomerular filtration rate            >             NRG        

 

 Serum or plasma glucose measurement (mass/volume)            116 mg/dL        
            

 

 Serum or plasma calcium measurement (mass/volume)            9.1 mg/dL        
    8.5-10.1        

 

 Serum or plasma total bilirubin measurement (mass/volume)            0.3 mg/dL
            0.1-1.0        

 

 Serum or plasma alkaline phosphatase measurement (enzymatic activity/volume)  
          103 U/L                    

 

 Serum or plasma aspartate aminotransferase measurement (enzymatic activity/
volume)            29 U/L            5-34        

 

 Serum or plasma alanine aminotransferase measurement (enzymatic activity/volume
)            23 U/L            0-55        

 

 Serum or plasma protein measurement (mass/volume)            6.2 g/dL         
   6.4-8.2        

 

 Serum or plasma albumin measurement (mass/volume)            3.7 g/dL         
   3.2-4.5        









 Serum or plasma amylase measurement (enzymatic activity/volume) - 17 23:
54         









 Serum or plasma amylase measurement (enzymatic activity/volume)            93 U
/L                    









 Lipase - 17 23:54         









 Lipase            37 U/L            8-78        









 Complete urinalysis with reflex to culture - 17 23:58         









 Urine color determination            YELLOW             NRG        

 

 Urine clarity determination            CLEAR             NRG        

 

 Urine pH measurement by test strip            5             5-9        

 

 Specific gravity of urine by test strip            1.030             1.016-
1.022        

 

 Urine protein assay by test strip, semi-quantitative            1+             
NEGATIVE        

 

 Urine glucose detection by automated test strip            NEGATIVE           
  NEGATIVE        

 

 Erythrocytes detection in urine sediment by light microscopy            
NEGATIVE             NEGATIVE        

 

 Urine ketones detection by automated test strip            1+             
NEGATIVE        

 

 Urine nitrite detection by test strip            NEGATIVE             NEGATIVE
        

 

 Urine total bilirubin detection by test strip            NEGATIVE             
NEGATIVE        

 

 Urine urobilinogen measurement by automated test strip (mass/volume)          
  NORMAL             NORMAL        

 

 Urine leukocyte esterase detection by dipstick            3+             
NEGATIVE        

 

 Automated urine sediment erythrocyte count by microscopy (number/high power 
field)            NONE             NRG        

 

 Automated urine sediment leukocyte count by microscopy (number/high power field
)             [HPF]            NRG        

 

 Bacteria detection in urine sediment by light microscopy            TRACE     
        NRG        

 

 Squamous epithelial cells detection in urine sediment by light microscopy     
       0-2             NRG        

 

 Crystals detection in urine sediment by light microscopy            PRESENT   
          NRG        

 

 Casts detection in urine sediment by light microscopy            NONE         
    NRG        

 

 Mucus detection in urine sediment by light microscopy            MODERATE     
        NRG        

 

 Complete urinalysis with reflex to culture            NO             NRG      
  

 

 Calcium oxalate crystals detection in urine sediment by light microscopy      
      FEW             NRG        









 Stool leukocytes detection by light microscopy - 17 00:58         









 FECAL WBC RESULTS            NO WBC'S OBSERVED ON DIRECT SMEAR             NRG
        

 

 FECAL NOTE            FECAL LEUKOCYTES MAY BE INTERMITTENTLY PRESENT OR       
      NRG        

 

 FECAL NOTE            UNEVENLY DISTRIBUTED IN STOOL SPECIMENS, AND WBC        
     NRG        

 

 FECAL NOTE            MORPHOLOGY DEGRADES DURING TRANSPORT             NRG    
    

 

 FECAL NOTE            NOTE:             NRG        









 C DIFFICILE AG + TOXIN A/B. - 17 00:58         









 RESULTS            NEGATIVE FOR ANTIGEN AND TOXIN A/B             NRG        









 Stool bacteria identification by culture - 17 00:58         



                              



Encounters

      





 ACCT No.            Visit Date/Time            Discharge            Status    
        Pt. Type            Provider            Facility            Loc./Unit  
          Complaint        

 

 [id number]            2017 22:56:00            2017 01:52:00    
        DIS            Emergency            TOMEKA  GONZALEZ K            [facility]            ER            DIARR[place]        

 

 [id number]            2016 14:20:00            2016 23:59:59    
        CLS            Outpatient            MAGALY NEELY MD            [facility]            RAD            BILATERAL HIP PAIN DOWN 
LEFT LEG, NUMBNESS        

 

 [id number]            2016 07:34:00            2016 23:59:59    
        CLS            Outpatient            MAGALY NEELY MD            [facility]            RAD            BILATERAL HIP PAIN DOWN 
LEFT LEG,NUMBNESS        

 

 [id number]            2015 10:55:00            2015 23:59:59    
        CLS            Outpatient            MAGALY NEELY MD            [facility]            RAD            TIA        

 

 [id number]            2015 12:07:00            2015 15:52:00    
        DIS            Emergency            ELIECER WU MD            
[facility]            ER            COORDINATION ISSUES  
      

 

 [id number]            10/23/2013 19:00:00            10/24/2013 17:30:00    
        DIS            Inpatient            DOMINGO MASSEY MD            [facility]            SURGICAL            CHOLELITHIASIS;
ABDOMINAL PAIN;NIDDM        

 

 [id number]            10/02/2011 10:05:00                                   
   Document Registration                                                       
     

 

 [id number]            2015 10:55:58                         [place]            
Document Registration

## 2018-09-08 NOTE — DIAGNOSTIC IMAGING REPORT
EXAM: CHEST 1 VIEW, AP/PA ONLY



INDICATION: Abdominal pain radiating to chest.



COMPARISON: CTA chest 06/30/2015.



FINDINGS:  Normal heart size and pulmonary vascularity. No focal

pulmonary opacity, pleural effusion or pneumothorax. No acute

osseous findings. Postoperative changes in both shoulders.



IMPRESSION: No acute cardiopulmonary findings.



Dictated by: 



  Dictated on workstation # [id number]

## 2018-09-09 ENCOUNTER — HOSPITAL ENCOUNTER (EMERGENCY)
Dept: HOSPITAL 75 - ER | Age: 83
Discharge: HOME | End: 2018-09-09
Payer: MEDICARE

## 2018-09-09 VITALS — BODY MASS INDEX: 22.22 KG/M2 | WEIGHT: 150 LBS | HEIGHT: 69 IN

## 2018-09-09 VITALS — DIASTOLIC BLOOD PRESSURE: 90 MMHG | SYSTOLIC BLOOD PRESSURE: 146 MMHG

## 2018-09-09 DIAGNOSIS — R10.84: Primary | ICD-10-CM

## 2018-09-09 DIAGNOSIS — E11.9: ICD-10-CM

## 2018-09-09 PROCEDURE — 74022 RADEX COMPL AQT ABD SERIES: CPT

## 2018-09-09 NOTE — DIAGNOSTIC IMAGING REPORT
INDICATION: Back pain.



Comparison with 09/08/2018.



Findings: Lungs are clear. There is no evidence of free air under

the diaphragm. Upright and supine abdomen shows a few scattered

air-fluid levels in the small bowel which are nondistended. The

colon shows normal stool and gas pattern to the rectum. There is

no organomegaly. No pathologic calcification. Degenerative

changes noted along the lumbar spine.



IMPRESSION: Finding consistent with mild generalized ileus

suggesting enteritis. No evidence of bowel obstruction.



Dictated by: 



  Dictated on workstation # [id number]

## 2018-09-09 NOTE — ED ABDOMINAL PAIN
General


Chief Complaint:  Abdominal/GI Problems


Stated Complaint:  CONSTIPATED,HERE YESTERDAY NO RELIEF


Sepsis Screen:  No Definite Risk


Source of Information:  Patient


Exam Limitations:  No Limitations





History of Present Illness


Date Seen by Provider:  Sep 9, 2018


Time Seen by Provider:  11:34


Initial Comments


Patient is an 86-year-old male presents to the emergency room with complaints 

of constipation. He was seen yesterday in the emergency room had an abdominal 

workup and CT scan revealing that he was constipated. He was given 

suppositories and told to use them until it resolved his constipation and told 

to take over-the-counter stool softeners daily. He reports that he started this 

regimen and has not had any relief. Denies nausea and vomiting.


Timing/Duration:  2-3 Days, Changing Over Time


Severity/Quality:  Mild, Aching


Location:  Generalized Abdomen


Radiation:  No Radiation


Associated Symptoms:  Denies Symptoms





Allergies and Home Medications


Allergies


Uncoded Allergies:  


     BEE STING (Allergy, Intermediate, SWELLING OF FACE, 10/2/11)





Home Medications


Ascorbate Calcium 500 Mg Tablet, 1,000 MG PO DAILY, (Reported)


Ciprofloxacin HCl 500 Mg Tablet, 500 MG PO BID


   Prescribed by: GONZALEZ ECKERT on 17


Lactobacillus Acidophilus 1 Each Capsule, 2 EACH PO QID


   Prescribed by: GONZALEZ ECKERT on 17


Metronidazole 500 Mg Tablet, 500 MG PO QID


   Prescribed by: GONZALEZ ECKERT on 17


Mu-Vits-Min Th/Lycopene/Lutein 1 Each Tablet, 1 EACH PO DAILY, (Reported)


[Potassium]  , PRN, (Reported)





Patient Home Medication List


Home Medication List Reviewed:  Yes





Review of Systems


Review of Systems


Constitutional:  see HPI; No chills, No fever


Gastrointestinal:  See HPI, Abdominal Pain (generalized abdomen), Constipated





All Other Systems Reviewed


Negative Unless Noted:  Yes





Past Medical-Social-Family Hx


Past Med/Social Hx:  Reviewed Nursing Past Med/Soc Hx


Patient Social History


Alcohol Use:  Denies Use


Recreational Drug Use:  No


Recent Foreign Travel:  No


Contact w/Someone Who Travel:  No


Recent Infectious Disease Expo:  No





Immunizations Up To Date


Tetanus Booster (TDap):  Unknown


Date of Pneumonia Vaccine:  Oct 1, 2010


Date of Influenza Vaccine:  Oct 7, 2013





Past Medical History


Surgeries:  Yes


Gallbladder, Orthopedic


Respiratory:  No


Cardiac:  No


Neurological:  No


Reproductive Disorders:  No


Genitourinary:  Yes


Benign Prostatic Hyperpl


Gastrointestinal:  No


Musculoskeletal:  Yes


Arthritis


Endocrine:  Yes (DIET CONTORLLED DIABETES)


Diabetes, Non-Insulin dep


HEENT:  No


Cancer:  No


Psychosocial:  No


Integumentary:  No


Blood Disorders:  No


Adverse Reaction/Blood Tranf:  No





Family Medical History


Reviewed Nursing Family Hx


No Pertinent Family Hx





Physical Exam


Vital Signs





Vital Signs - First Documented








 18





 10:50 14:09


 


Temp 98.5 


 


Pulse  77


 


Resp  16


 


B/P (MAP) 146/90 (108) 


 


Pulse Ox 96 





Capillary Refill : Less Than 3 Seconds


Height/Weight/BMI


Height: 5'9.00"


Weight: 150lbs. 13.0oz. 68.358797eo;  BMI


Method:Stated


General Appearance:  WD/WN, no apparent distress


Neck:  non-tender, full range of motion, supple, normal inspection


Respiratory:  chest non-tender, lungs clear, normal breath sounds, no 

respiratory distress, no accessory muscle use


Cardiovascular:  normal peripheral pulses, regular rate, rhythm, no edema, no 

gallop, no JVD, no murmur


Gastrointestinal:  normal bowel sounds, non tender, soft, no organomegaly, no 

pulsatile mass


Neurologic/Psychiatric:  alert, normal mood/affect, oriented x 3


Skin:  normal color, warm/dry





Progress/Results/Core Measures


Results/Orders


My Orders





Orders - VITALY ARITA


Acute Abd Series (18 11:32)


Magnesium Citrate Oral Soln (Citrate Of (18 12:15)





Medications Given in ED





Vital Signs/I&O











 18





 10:50 14:09


 


Temp 98.5 98.5


 


Pulse  77


 


Resp  16


 


B/P (MAP) 146/90 (108) 146/90 (108)


 


Pulse Ox 96 96














Blood Pressure Mean:  108











Progress


Progress Note :  


   Time:  14:00


Progress Note


I have seen and evaluated the patient. I have given him magnesium citrate in 

the emergency room. He agrees with plans of discharge, return precautions were 

given.





Diagnostic Imaging





   Diagonstic Imaging:  Xray


   Plain Films/CT/US/NM/MRI:  abdomen


Comments


NAME:      ERNST NICHOLSON


MED REC#:   [id number]


ACCOUNT#:   [id number]


PT STATUS:   REG ER


:      1931


PHYSICIAN:    VITALY ARITA ARNP


ADMIT DATE:   18/ER


 ***Signed***


Date of Exam:   18





ACUTE ABD SERIES


 





INDICATION: Back pain.





Comparison with 2018.





Findings: Lungs are clear. There is no evidence of free air under


the diaphragm. Upright and supine abdomen shows a few scattered


air-fluid levels in the small bowel which are nondistended. The


colon shows normal stool and gas pattern to the rectum. There is


no organomegaly. No pathologic calcification. Degenerative


changes noted along the lumbar spine.





IMPRESSION: Finding consistent with mild generalized ileus


suggesting enteritis. No evidence of bowel obstruction.





Dictated by: 





  Dictated on workstation # [id number]





[id number]





Dict:      18 1220


Trans:      18 1257





Interpreted by:         ELVIRA MCKEE MD


Electronically signed by:   ELVIRA MCKEE MD 18 1257





Departure


Impression





 Primary Impression:  


 Abdominal pain


Disposition:  01 HOME, SELF-CARE


Condition:  Stable/Unchanged





Departure-Patient Inst.


Decision time for Depature:  14:00


Referrals:  


MAGALY NEELY MD (PCP/Family)


Primary Care Physician


Patient Instructions:  Acute Abdomen (Belly Pain)





Add. Discharge Instructions:  


Continue previously prescribed medications. Follow up with Dr. Flowers within 1 

week for recheck. Return back to the emergency room for any worsening symptoms 

or concerns as needed. All discharge instructions reviewed with patient and/or 

family. Voiced understanding.











VITALY ARITA Sep 9, 2018 11:35

## 2018-09-09 NOTE — XMS REPORT
Continuity of Care Document

 Created on: 2018



ERNST NICHOLSON

External Reference #: [id number]

: 1931

Sex: Male



Demographics







 Address  [address] [place]  [address]

 

 Home Phone  [phone] x

 

 Preferred Language  Unknown

 

 Marital Status  Unknown

 

 [employer] Affiliation  Unknown

 

 Race  Unknown

 

 Ethnic Group  Unknown





Author







 Author  [facility]

 

 Organization  [facility]

 

 Address  Unknown

 

 Phone  Unavailable



              



Allergies

      





 Active            Description            Code            Type            
Severity            Reaction            Onset            Reported/Identified   
         Relationship to Patient            Clinical Status        

 

 Yes            BEE STING            BEE STING                         Moderate
            SWELLING OF FAC                         10/02/2011                 
                 



                  



Medications

      



There is no data.                  



Problems

      





 Date Dx Coded            Attending            Type            Code            
Diagnosis            Diagnosed By        

 

 10/02/2011                         Ot            922.1                        
          

 

 10/02/2011                         Ot            959.11                       
           

 

 10/02/2011                         Ot            E000.8                       
           

 

 10/02/2011                         Ot            E029.9                       
           

 

 10/02/2011                         Ot            E849.0                       
           

 

 10/02/2011                         Ot            E888.9                       
           

 

 10/24/2013            BRYSON OSORIO, DOMINGO            Ot            250.00        
    DIAB CLAUDIO WO COMPL, TYPE II OR UNSPEC TY                     

 

 10/24/2013            BRYSON OSORIO, DOMINGO            Ot            574.00        
    CHOLELITH W AC CHOLECYST                     

 

 10/24/2013            DOMINGO MASSEY MD            Ot            574.10        
    CHOLELITH W CHOLECYS NEC                     

 

 2015            ELIECER WU MD            Ot            250.00  
          DIAB CLAUDIO WO COMPL, TYPE II OR UNSPEC TY                     

 

 2015            ELIECER WU MD            Ot            780.4   
         DIZZINESS AND GIDDINESS                     

 

 2015            ELIECER WU MD            Ot            784.0   
         HEADACHE                     

 

 2015            ELIECER WU MD            Ot            790.99  
          BLOOD EXAM - OTH NONSPECIFIC FINDINGS                     

 

 2015            CHIN OSORIO, MAGALY R            Ot            435.9       
                           

 

 2016            MAGALY NEELY MD R            Ot            435.9       
                           

 

 2016            MAGALY NEELY MD R            Ot            M25.551     
                             

 

 2016            MAGALY NEELY MD R            Ot            M25.552     
                             

 

 2017            MAGALY NEELY MD            Ot            435.9       
     TRANS CEREB ISCHEMIA NOS                     

 

 2017            MAGALY NEELY MD R            Ot            M25.551     
       PAIN IN RIGHT HIP                     

 

 2017            MAGALY NEELY MD            Ot            M25.552     
       PAIN IN LEFT HIP                     

 

 2017            MAGALY NEELY MD, Ot            M54.17      
      RADICULOPATHY, LUMBOSACRAL REGION                     

 

 2017            MAGALY NEELY MD            Ot            M25.551     
       PAIN IN RIGHT HIP                     

 

 2017            MAGALY NEELY MD            Ot            M25.552     
       PAIN IN LEFT HIP                     

 

 2017            TOMEKA DO, GONZALEZ K            Ot            E11.9          
  TYPE 2 DIABETES MELLITUS WITHOUT COMPLIC                     

 

 2017            TOMEKA DO, GONZALEZ K            Ot            I88.0          
  NONSPECIFIC MESENTERIC LYMPHADENITIS                     

 

 2017            TOMEKA DO, GONZALEZ K            Ot            M19.90         
   UNSPECIFIED OSTEOARTHRITIS, UNSPECIFIED                      

 

 2017            TOMEKA DO, GONZALEZ K            Ot            N40.0          
  BENIGN PROSTATIC HYPERPLASIA WITHOUT LOW                     

 

 2017            TOMEKA DO GONZALEZ K            Ot            R19.7          
  DIARRHEA, UNSPECIFIED                     

 

 2017            TOMEKA DO GONZALEZ K            Ot            Z87.891        
    PERSONAL HISTORY OF NICOTINE DEPENDENCE                     

 

 2017            TOMEKA SOLOMON LOOMISA K            Ot            Z98.1          
  ARTHRODESIS STATUS                     

 

 2017            MAGALY NEELY MD            Ot            435.9       
     TRANS CEREB ISCHEMIA NOS                     

 

 2017            MAGALY NEELY MD            Ot            M25.551     
       PAIN IN RIGHT HIP                     

 

 2017            MAGALY NEELY MD            Ot            M25.552     
       PAIN IN LEFT HIP                     

 

 2017            MAGALY NEELY MD, Ot            M54.17      
      RADICULOPATHY, LUMBOSACRAL REGION                     

 

 2017            MAGALY NEELY MD, Ot            M25.551     
       PAIN IN RIGHT HIP                     

 

 2017            MAGALY NEELY MD, Ot            M25.552     
       PAIN IN LEFT HIP                     



                                                                               
         



Procedures

      





 Code            Description            Performed By            Performed On   
     

 

             51.23                                  LAPAROSCOPIC 
CHOLECYSTECTOMY                                   10/24/2013        



                  



Results

      





 Test            Result            Range        









 Complete blood count (CBC) with automated white blood cell (WBC) differential 
- 17 23:54         









 Blood leukocytes automated count (number/volume)            6.2 10*3/uL       
     4.3-11.0        

 

 Blood erythrocytes automated count (number/volume)            4.03 10*6/uL    
        4.35-5.85        

 

 Venous blood hemoglobin measurement (mass/volume)            13.6 g/dL        
    13.3-17.7        

 

 Blood hematocrit (volume fraction)            39 %            40-54        

 

 Automated erythrocyte mean corpuscular volume            97 [foz_us]          
  80-99        

 

 Automated erythrocyte mean corpuscular hemoglobin (mass per erythrocyte)      
      34 pg            25-34        

 

 Automated erythrocyte mean corpuscular hemoglobin concentration measurement (
mass/volume)            35 g/dL            32-36        

 

 Automated erythrocyte distribution width ratio            11.7 %            
10.0-14.5        

 

 Automated blood platelet count (count/volume)            191 10*3/uL          
  130-400        

 

 Automated blood platelet mean volume measurement            10.1 [foz_us]     
       7.4-10.4        

 

 Automated blood neutrophils/100 leukocytes            44 %            42-75   
     

 

 Automated blood lymphocytes/100 leukocytes            37 %            12-44   
     

 

 Blood monocytes/100 leukocytes            13 %            0-12        

 

 Automated blood eosinophils/100 leukocytes            5 %            0-10     
   

 

 Automated blood basophils/100 leukocytes            1 %            0-10        

 

 Blood neutrophils automated count (number/volume)            2.8 10*3         
   1.8-7.8        

 

 Blood lymphocytes automated count (number/volume)            2.3 10*3         
   1.0-4.0        

 

 Blood monocytes automated count (number/volume)            0.8 10*3            
0.0-1.0        

 

 Automated eosinophil count            0.3 10*3/uL            0.0-0.3        

 

 Automated blood basophil count (count/volume)            0.1 10*3/uL          
  0.0-0.1        









 Comprehensive metabolic panel - 17 23:54         









 Serum or plasma sodium measurement (moles/volume)            141 mmol/L       
     135-145        

 

 Serum or plasma potassium measurement (moles/volume)            3.4 mmol/L    
        3.6-5.0        

 

 Serum or plasma chloride measurement (moles/volume)            109 mmol/L     
               

 

 Carbon dioxide            22 mmol/L            21-32        

 

 Serum or plasma anion gap determination (moles/volume)            10 mmol/L   
         5-14        

 

 Serum or plasma urea nitrogen measurement (mass/volume)            22 mg/dL   
         7-18        

 

 Serum or plasma creatinine measurement (mass/volume)            1.11 mg/dL    
        0.60-1.30        

 

 Serum or plasma urea nitrogen/creatinine mass ratio            20             
NRG        

 

 Serum or plasma creatinine measurement with calculation of estimated 
glomerular filtration rate            >             NRG        

 

 Serum or plasma glucose measurement (mass/volume)            116 mg/dL        
            

 

 Serum or plasma calcium measurement (mass/volume)            9.1 mg/dL        
    8.5-10.1        

 

 Serum or plasma total bilirubin measurement (mass/volume)            0.3 mg/dL
            0.1-1.0        

 

 Serum or plasma alkaline phosphatase measurement (enzymatic activity/volume)  
          103 U/L                    

 

 Serum or plasma aspartate aminotransferase measurement (enzymatic activity/
volume)            29 U/L            5-34        

 

 Serum or plasma alanine aminotransferase measurement (enzymatic activity/volume
)            23 U/L            0-55        

 

 Serum or plasma protein measurement (mass/volume)            6.2 g/dL         
   6.4-8.2        

 

 Serum or plasma albumin measurement (mass/volume)            3.7 g/dL         
   3.2-4.5        









 Serum or plasma amylase measurement (enzymatic activity/volume) - 17 23:
54         









 Serum or plasma amylase measurement (enzymatic activity/volume)            93 U
/L                    









 Lipase - 17 23:54         









 Lipase            37 U/L            8-78        









 Complete urinalysis with reflex to culture - 17 23:58         









 Urine color determination            YELLOW             NRG        

 

 Urine clarity determination            CLEAR             NRG        

 

 Urine pH measurement by test strip            5             5-9        

 

 Specific gravity of urine by test strip            1.030             1.016-
1.022        

 

 Urine protein assay by test strip, semi-quantitative            1+             
NEGATIVE        

 

 Urine glucose detection by automated test strip            NEGATIVE           
  NEGATIVE        

 

 Erythrocytes detection in urine sediment by light microscopy            
NEGATIVE             NEGATIVE        

 

 Urine ketones detection by automated test strip            1+             
NEGATIVE        

 

 Urine nitrite detection by test strip            NEGATIVE             NEGATIVE
        

 

 Urine total bilirubin detection by test strip            NEGATIVE             
NEGATIVE        

 

 Urine urobilinogen measurement by automated test strip (mass/volume)          
  NORMAL             NORMAL        

 

 Urine leukocyte esterase detection by dipstick            3+             
NEGATIVE        

 

 Automated urine sediment erythrocyte count by microscopy (number/high power 
field)            NONE             NRG        

 

 Automated urine sediment leukocyte count by microscopy (number/high power field
)             [HPF]            NRG        

 

 Bacteria detection in urine sediment by light microscopy            TRACE     
        NRG        

 

 Squamous epithelial cells detection in urine sediment by light microscopy     
       0-2             NRG        

 

 Crystals detection in urine sediment by light microscopy            PRESENT   
          NRG        

 

 Casts detection in urine sediment by light microscopy            NONE         
    NRG        

 

 Mucus detection in urine sediment by light microscopy            MODERATE     
        NRG        

 

 Complete urinalysis with reflex to culture            NO             NRG      
  

 

 Calcium oxalate crystals detection in urine sediment by light microscopy      
      FEW             NRG        









 Stool leukocytes detection by light microscopy - 17 00:58         









 FECAL WBC RESULTS            NO WBC'S OBSERVED ON DIRECT SMEAR             NRG
        

 

 FECAL NOTE            FECAL LEUKOCYTES MAY BE INTERMITTENTLY PRESENT OR       
      NRG        

 

 FECAL NOTE            UNEVENLY DISTRIBUTED IN STOOL SPECIMENS, AND WBC        
     NRG        

 

 FECAL NOTE            MORPHOLOGY DEGRADES DURING TRANSPORT             NRG    
    

 

 FECAL NOTE            NOTE:             NRG        









 C DIFFICILE AG + TOXIN A/B. - 17 00:58         









 RESULTS            NEGATIVE FOR ANTIGEN AND TOXIN A/B             NRG        









 Stool bacteria identification by culture - 17 00:58         



                              



Encounters

      





 ACCT No.            Visit Date/Time            Discharge            Status    
        Pt. Type            Provider            Facility            Loc./Unit  
          Complaint        

 

 [id number]            2017 22:56:00            2017 01:52:00    
        DIS            Emergency            TOMEKA  GONZALEZ K            [facility]            ER            [place]        

 

 [id number]            2016 14:20:00            2016 23:59:59    
        CLS            Outpatient            MAGALY NEELY MD            [facility]            RAD            BILATERAL HIP PAIN DOWN 
LEFT LEG, NUMBNESS        

 

 [id number]            2016 07:34:00            2016 23:59:59    
        CLS            Outpatient            MAGALY NEELY MD            [facility]            RAD            BILATERAL HIP PAIN DOWN 
LEFT LEG,NUMBNESS        

 

 [id number]            2015 10:55:00            2015 23:59:59    
        CLS            Outpatient            MAGALY NEELY MD            [facility]            RAD            TIA        

 

 [id number]            2015 12:07:00            2015 15:52:00    
        DIS            Emergency            ELIECER WU MD            
[facility]            ER            COORDINATION ISSUES  
      

 

 [id number]            10/23/2013 19:00:00            10/24/2013 17:30:00    
        DIS            Inpatient            DOMINGO MASSEY MD            [facility]            SURGICAL            CHOLELITHIASIS;
ABDOMINAL PAIN;NIDDM        

 

 [id number]            10/02/2011 10:05:00                                   
   Document Registration                                                       
     

 

 [id number]            2015 10:55:58                         [place]            
Document Registration

## 2018-10-04 ENCOUNTER — HOSPITAL ENCOUNTER (OUTPATIENT)
Dept: HOSPITAL 75 - PREOP | Age: 83
Discharge: HOME | End: 2018-10-04
Attending: SURGERY
Payer: MEDICARE

## 2018-10-04 VITALS — BODY MASS INDEX: 23.23 KG/M2 | HEIGHT: 67 IN | WEIGHT: 148 LBS

## 2018-10-04 DIAGNOSIS — Z01.818: Primary | ICD-10-CM

## 2018-10-05 ENCOUNTER — HOSPITAL ENCOUNTER (OUTPATIENT)
Dept: HOSPITAL 75 - ENDO | Age: 83
Discharge: HOME | End: 2018-10-05
Attending: SURGERY
Payer: MEDICARE

## 2018-10-05 VITALS — SYSTOLIC BLOOD PRESSURE: 114 MMHG | DIASTOLIC BLOOD PRESSURE: 57 MMHG

## 2018-10-05 VITALS — WEIGHT: 148 LBS | HEIGHT: 67 IN | BODY MASS INDEX: 23.23 KG/M2

## 2018-10-05 VITALS — DIASTOLIC BLOOD PRESSURE: 59 MMHG | SYSTOLIC BLOOD PRESSURE: 116 MMHG

## 2018-10-05 VITALS — SYSTOLIC BLOOD PRESSURE: 142 MMHG | DIASTOLIC BLOOD PRESSURE: 67 MMHG

## 2018-10-05 VITALS — SYSTOLIC BLOOD PRESSURE: 116 MMHG | DIASTOLIC BLOOD PRESSURE: 59 MMHG

## 2018-10-05 DIAGNOSIS — K57.30: Primary | ICD-10-CM

## 2018-10-05 DIAGNOSIS — K59.00: ICD-10-CM

## 2018-10-05 DIAGNOSIS — K64.1: ICD-10-CM

## 2018-10-05 DIAGNOSIS — E11.40: ICD-10-CM

## 2018-10-05 NOTE — DISCHARGE INST-SURGICAL
D/C Lap Instructions-BRYSON


Follow Up PRN





Activity as tolerated








High Fiber Diet 25g or more per day





Avoid Alcohol, Caffeine, Spicy Greasy and Acid foods.





Drink 64 fluid oz or more of fluids per day.





Symptoms to Report: Fever over 101 degree F, Nausea/Vomiting 


If any problems/questions: Contact your physician or go to Emergency Room











DOMINGO MASSEY MD Oct 5, 2018 11:01 am

## 2018-10-05 NOTE — PROGRESS NOTE-PRE OPERATIVE
Pre-Operative Progress Note


H&P Reviewed


The H&P was reviewed, patient examined and no changes noted.


Date Seen by Provider:  Oct 5, 2018


Time Seen by Provider:  10:00


Date H&P Reviewed:  Oct 5, 2018


Time H&P Reviewed:  09:30


Pre-Operative Diagnosis:  abd pain, mesenteric adenitis.











DOMINGO MASSEY MD Oct 5, 2018 10:58 am

## 2018-10-05 NOTE — OPERATIVE REPORT
DATE OF SERVICE:  10/05/2018



ATTENDING PRIMARY CARE PHYSICIAN:

Dr. Chandra.



PREOPERATIVE DIAGNOSES:

1.  Lower quadrant abdominal pain.

2.  History of significant diverticulosis.



POSTOPERATIVE DIAGNOSES:

Chronic stage II external and internal hemorrhoids, moderate-to-severe sigmoid

diverticulosis.  The remainder of the colon was normal.



PROCEDURE:

Colonoscopy.



SURGEON:

Domingo Massey MD



ANESTHESIA:

Conscious sedation.



ESTIMATED BLOOD LOSS:

Minimal.



FINDINGS:

Chronic stage II external and internal hemorrhoids, not actively edematous nor

inflamed and no bleeding.  Prostate gland was palpable and appeared normal. 

There was a moderate to severe sigmoid diverticulosis; however, there were no

mucosal inflammatory changes to indicate any active diverticulitis.  The

remainder of the colon was normal.  There were no polyps or any neoplasms

identified.



DISPOSITION:

The patient tolerated the procedure well.



INDICATIONS:

The patient is an 87-year-old male known to us.  He has had a history of chronic

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  He is

very active and continues to work and volunteers time on a daily basis.  He

reports that one month ago he had severe constipation as well as sharp pain in

the lower quadrants of the abdomen for a duration of 3 days.  He was started on

laxatives and did have a significant bowel movement and the pain eventually went

away.  His last colonoscopy was in 2004, was found to have a pretty significant

sigmoid diverticulosis.  On his most recent episode of pain, a CT scan was

performed, which did show mesenteric lymphadenitis.



DESCRIPTION OF PROCEDURE:

The patient was brought to the endoscopy suite, laid in the left lateral

decubitus position.  After adequate IV pain and sedating medications and

conscious sedation anesthesia, a digital rectal examination was performed.  Mild

chronic stage II external and internal hemorrhoids were identified, which were

not actively edematous nor inflamed and no bleeding.  Normal sphincter tone was

felt and there were no palpable masses.  Prostate gland was palpable and

appeared normal.



The endoscope was then intubated into the anus and rectum gently insufflated. 

The endoscope was then advanced through the valves of Youssef of the rectum with

no polyps or any neoplasms identified.  The endoscope was then advanced to the

sigmoid colon where a moderate to severe sigmoid diverticulosis identified. 

There were no mucosal inflammatory changes to indicate any active

diverticulitis.  The endoscope was then advanced to the remainder of the

descending, transverse and ascending colon to the cecum.  These segments were

normal.  There were no polyps or any neoplasms identified throughout the colon

or rectum.  The endoscope was then slowly withdrawn while taking a second look

and suctioning of residual air with no additional findings.



The patient tolerated the procedure well.  We will recommend continued medical

management with a high fiber diet with at least 30 grams of fiber per day as

well as copious amounts of water to promote soft stools on a daily basis and

prevention of constipation.  He does have a significant diverticulosis, but the

goal is to refrain any complications.  He does not have a family history of

colon cancer and he may wait another 10 years for another colonoscopy.  However,

we would recommend one if he continues to be active and he does have a change in

bowel habits or worsening or recurrent pain.





Job ID: [id number]

DocumentID: [id number]

Dictated Date:  10/05/2018 10:54:28

Transcription Date: 10/05/2018 13:03:43

Dictated By: DOMINGO MASSEY MD

## 2018-10-05 NOTE — XMS REPORT
Continuity of Care Document

 Created on: 10/05/2018



ERNST NICHOLSON

External Reference #: [id number]

: 1931

Sex: Male



Demographics







 Address  [address] [place]  [address]

 

 Home Phone  [phone] x

 

 Preferred Language  Unknown

 

 Marital Status  Unknown

 

 Bahai Affiliation  Unknown

 

 Race  Unknown

 

 Ethnic Group  Unknown





Author







 Author  [facility]

 

 Organization  [facility]

 

 Address  Unknown

 

 Phone  Unavailable



              



Allergies

      





 Active            Description            Code            Type            
Severity            Reaction            Onset            Reported/Identified   
         Relationship to Patient            Clinical Status        

 

 Yes            BEE STING            BEE STING                         Moderate
            SWELLING OF FAC                         10/02/2011                 
                 



                  



Medications

      



There is no data.                  



Problems

      





 Date Dx Coded            Attending            Type            Code            
Diagnosis            Diagnosed By        

 

 10/02/2011                         Ot            922.1                        
          

 

 10/02/2011                         Ot            959.11                       
           

 

 10/02/2011                         Ot            E000.8                       
           

 

 10/02/2011                         Ot            E029.9                       
           

 

 10/02/2011                         Ot            E849.0                       
           

 

 10/02/2011                         Ot            E888.9                       
           

 

 10/24/2013            BRYSON OSORIO, DOMINGO            Ot            250.00        
    DIAB CLAUDIO WO COMPL, TYPE II OR UNSPEC TY                     

 

 10/24/2013            BRYSON OSORIO, DOMINGO            Ot            574.00        
    CHOLELITH W AC CHOLECYST                     

 

 10/24/2013            DOMINGO MASSEY MD            Ot            574.10        
    CHOLELITH W CHOLECYS NEC                     

 

 2015            ELIECER WU MD            Ot            250.00  
          DIAB CLAUDIO WO COMPL, TYPE II OR UNSPEC TY                     

 

 2015            ELIECER WU MD            Ot            780.4   
         DIZZINESS AND GIDDINESS                     

 

 2015            ELIECER WU MD            Ot            784.0   
         HEADACHE                     

 

 2015            ELIECER WU MD            Ot            790.99  
          BLOOD EXAM - OTH NONSPECIFIC FINDINGS                     

 

 2015            CHIN OSORIO, MAGALY R            Ot            435.9       
                           

 

 2016            MAGALY NEELY MD R            Ot            435.9       
                           

 

 2016            MAGALY NEELY MD R            Ot            M25.551     
                             

 

 2016            MAGALY NEELY MD R            Ot            M25.552     
                             

 

 2017            MAGALY NEELY MD            Ot            435.9       
     TRANS CEREB ISCHEMIA NOS                     

 

 2017            MAGALY NEELY MD R            Ot            M25.551     
       PAIN IN RIGHT HIP                     

 

 2017            MAGALY NEELY MD            Ot            M25.552     
       PAIN IN LEFT HIP                     

 

 2017            MAGALY NEELY MD            Ot            M54.17      
      RADICULOPATHY, LUMBOSACRAL REGION                     

 

 2017            MAGALY NEELY MD R            Ot            M25.551     
       PAIN IN RIGHT HIP                     

 

 2017            MAGALY NEELY MD R            Ot            M25.552     
       PAIN IN LEFT HIP                     

 

 2017            TOMEKA DO, GONZALEZ K            Ot            E11.9          
  TYPE 2 DIABETES MELLITUS WITHOUT COMPLIC                     

 

 2017            TOMEKA DO, GONZALEZ K            Ot            I88.0          
  NONSPECIFIC MESENTERIC LYMPHADENITIS                     

 

 2017            TOMEKA DO, GONZALEZ K            Ot            M19.90         
   UNSPECIFIED OSTEOARTHRITIS, UNSPECIFIED                      

 

 2017            TOMEKA DO, GONZALEZ K            Ot            N40.0          
  BENIGN PROSTATIC HYPERPLASIA WITHOUT LOW                     

 

 2017            TOMEKA DO, GONZALEZ K            Ot            R19.7          
  DIARRHEA, UNSPECIFIED                     

 

 2017            TOMEKA DO, GONZALEZ K            Ot            Z87.891        
    PERSONAL HISTORY OF NICOTINE DEPENDENCE                     

 

 2017            TOMEKA DO GONZALEZ K            Ot            Z98.1          
  ARTHRODESIS STATUS                     

 

 2017            MAGALY NEELY MD            Ot            435.9       
     TRANS CEREB ISCHEMIA NOS                     

 

 2017            MAGALY NEELY MD R            Ot            M25.551     
       PAIN IN RIGHT HIP                     

 

 2017            MAGALY NEELY MD            Ot            M25.552     
       PAIN IN LEFT HIP                     

 

 2017            MAGALY NEELY MD            Ot            M54.17      
      RADICULOPATHY, LUMBOSACRAL REGION                     

 

 2017            MAGALY NEELY MD            Ot            M25.551     
       PAIN IN RIGHT HIP                     

 

 2017            MAAGLY NEELY MD            Ot            M25.552     
       PAIN IN LEFT HIP                     

 

 2018            MIKY RICHARDS MD            Ot            E11.9      
      TYPE 2 DIABETES MELLITUS WITHOUT COMPLIC                     

 

 2018            MIKY RICHARDS MD            Ot            K59.00     
       CONSTIPATION, UNSPECIFIED                     

 

 2018            MIKY RICHARDS MD            Ot            R07.9      
      CHEST PAIN, UNSPECIFIED                     

 

 2018            MIKY RICHARDS MD            Ot            R10.33     
       PERIUMBILICAL PAIN                     

 

 2018            MIKY RICHARDS MD            Ot            Z87.448    
        PERSONAL HISTORY OF OTHER DISEASES OF UR                     

 

 2018            MIKY RICHARDS MD            Ot            Z90.49     
       ACQUIRED ABSENCE OF OTHER SPECIFIED PART                     

 

 2018            MAGALY NEELY MD            Ot            435.9       
     TRANS CEREB ISCHEMIA NOS                     

 

 2018            MAGALY NEELY MD R            Ot            M25.551     
       PAIN IN RIGHT HIP                     

 

 2018            MAGALY NEELY MD            Ot            M25.552     
       PAIN IN LEFT HIP                     

 

 2018            MAGALY NEELY MD            Ot            M54.17      
      RADICULOPATHY, LUMBOSACRAL REGION                     

 

 2018            MAGALY NEELY MD            Ot            M25.551     
       PAIN IN RIGHT HIP                     

 

 2018            MAGLAY NEELY MD            Ot            M25.552     
       PAIN IN LEFT HIP                     

 

 2018            ELIECER WU MD, Ot            R10.9   
         UNSPECIFIED ABDOMINAL PAIN                     

 

 2018            MIKY RICHARDS MD            Ot            E11.9      
      TYPE 2 DIABETES MELLITUS WITHOUT COMPLIC                     

 

 2018            MIKY RICHARDS MD            Ot            K59.00     
       CONSTIPATION, UNSPECIFIED                     

 

 2018            MIKY RICHARDS MD            Ot            R07.9      
      CHEST PAIN, UNSPECIFIED                     

 

 2018            MIKY RICHARDS MD            Ot            R10.33     
       PERIUMBILICAL PAIN                     

 

 2018            MIKY RICHARDS MD            Ot            Z87.448    
        PERSONAL HISTORY OF OTHER DISEASES OF UR                     

 

 2018            MIKY RICHARDS MD            Ot            Z90.49     
       ACQUIRED ABSENCE OF OTHER SPECIFIED PART                     

 

 2018            ELIECER WU MD, Ot            R10.9   
         UNSPECIFIED ABDOMINAL PAIN                     

 

 2018            VITALY ARITA            Ot            E11.9            
TYPE 2 DIABETES MELLITUS WITHOUT COMPLIC                     

 

 2018            VITALY ARITA            Ot            K59.00          
  CONSTIPATION, UNSPECIFIED                     

 

 2018            VITALY ARITA            Ot            R10.84          
  GENERALIZED ABDOMINAL PAIN                     



                                                                               
                                                       



Procedures

      





 Code            Description            Performed By            Performed On   
     

 

             51.23                                  LAPAROSCOPIC 
CHOLECYSTECTOMY                                   10/24/2013        



                  



Results

      





 Test            Result            Range        









 Complete blood count (CBC) with automated white blood cell (WBC) differential 
- 17 23:54         









 Blood leukocytes automated count (number/volume)            6.2 10*3/uL       
     4.3-11.0        

 

 Blood erythrocytes automated count (number/volume)            4.03 10*6/uL    
        4.35-5.85        

 

 Venous blood hemoglobin measurement (mass/volume)            13.6 g/dL        
    13.3-17.7        

 

 Blood hematocrit (volume fraction)            39 %            40-54        

 

 Automated erythrocyte mean corpuscular volume            97 [foz_us]          
  80-99        

 

 Automated erythrocyte mean corpuscular hemoglobin (mass per erythrocyte)      
      34 pg            25-34        

 

 Automated erythrocyte mean corpuscular hemoglobin concentration measurement (
mass/volume)            35 g/dL            32-36        

 

 Automated erythrocyte distribution width ratio            11.7 %            
10.0-14.5        

 

 Automated blood platelet count (count/volume)            191 10*3/uL          
  130-400        

 

 Automated blood platelet mean volume measurement            10.1 [foz_us]     
       7.4-10.4        

 

 Automated blood neutrophils/100 leukocytes            44 %            42-75   
     

 

 Automated blood lymphocytes/100 leukocytes            37 %            12-44   
     

 

 Blood monocytes/100 leukocytes            13 %            0-12        

 

 Automated blood eosinophils/100 leukocytes            5 %            0-10     
   

 

 Automated blood basophils/100 leukocytes            1 %            0-10        

 

 Blood neutrophils automated count (number/volume)            2.8 10*3         
   1.8-7.8        

 

 Blood lymphocytes automated count (number/volume)            2.3 10*3         
   1.0-4.0        

 

 Blood monocytes automated count (number/volume)            0.8 10*3            
0.0-1.0        

 

 Automated eosinophil count            0.3 10*3/uL            0.0-0.3        

 

 Automated blood basophil count (count/volume)            0.1 10*3/uL          
  0.0-0.1        









 Comprehensive metabolic panel - 17 23:54         









 Serum or plasma sodium measurement (moles/volume)            141 mmol/L       
     135-145        

 

 Serum or plasma potassium measurement (moles/volume)            3.4 mmol/L    
        3.6-5.0        

 

 Serum or plasma chloride measurement (moles/volume)            109 mmol/L     
               

 

 Carbon dioxide            22 mmol/L            21-32        

 

 Serum or plasma anion gap determination (moles/volume)            10 mmol/L   
         5-14        

 

 Serum or plasma urea nitrogen measurement (mass/volume)            22 mg/dL   
         7-18        

 

 Serum or plasma creatinine measurement (mass/volume)            1.11 mg/dL    
        0.60-1.30        

 

 Serum or plasma urea nitrogen/creatinine mass ratio            20             
NRG        

 

 Serum or plasma creatinine measurement with calculation of estimated 
glomerular filtration rate            >             NRG        

 

 Serum or plasma glucose measurement (mass/volume)            116 mg/dL        
            

 

 Serum or plasma calcium measurement (mass/volume)            9.1 mg/dL        
    8.5-10.1        

 

 Serum or plasma total bilirubin measurement (mass/volume)            0.3 mg/dL
            0.1-1.0        

 

 Serum or plasma alkaline phosphatase measurement (enzymatic activity/volume)  
          103 U/L                    

 

 Serum or plasma aspartate aminotransferase measurement (enzymatic activity/
volume)            29 U/L            5-34        

 

 Serum or plasma alanine aminotransferase measurement (enzymatic activity/volume
)            23 U/L            0-55        

 

 Serum or plasma protein measurement (mass/volume)            6.2 g/dL         
   6.4-8.2        

 

 Serum or plasma albumin measurement (mass/volume)            3.7 g/dL         
   3.2-4.5        









 Serum or plasma amylase measurement (enzymatic activity/volume) - 17 23:
54         









 Serum or plasma amylase measurement (enzymatic activity/volume)            93 U
/L                    









 Lipase - 17 23:54         









 Lipase            37 U/L            8-78        









 Complete urinalysis with reflex to culture - 17 23:58         









 Urine color determination            YELLOW             NRG        

 

 Urine clarity determination            CLEAR             NRG        

 

 Urine pH measurement by test strip            5             5-9        

 

 Specific gravity of urine by test strip            1.030             1.016-
1.022        

 

 Urine protein assay by test strip, semi-quantitative            1+             
NEGATIVE        

 

 Urine glucose detection by automated test strip            NEGATIVE           
  NEGATIVE        

 

 Erythrocytes detection in urine sediment by light microscopy            
NEGATIVE             NEGATIVE        

 

 Urine ketones detection by automated test strip            1+             
NEGATIVE        

 

 Urine nitrite detection by test strip            NEGATIVE             NEGATIVE
        

 

 Urine total bilirubin detection by test strip            NEGATIVE             
NEGATIVE        

 

 Urine urobilinogen measurement by automated test strip (mass/volume)          
  NORMAL             NORMAL        

 

 Urine leukocyte esterase detection by dipstick            3+             
NEGATIVE        

 

 Automated urine sediment erythrocyte count by microscopy (number/high power 
field)            NONE             NRG        

 

 Automated urine sediment leukocyte count by microscopy (number/high power field
)             [HPF]            NRG        

 

 Bacteria detection in urine sediment by light microscopy            TRACE     
        NRG        

 

 Squamous epithelial cells detection in urine sediment by light microscopy     
       0-2             NRG        

 

 Crystals detection in urine sediment by light microscopy            PRESENT   
          NRG        

 

 Casts detection in urine sediment by light microscopy            NONE         
    NRG        

 

 Mucus detection in urine sediment by light microscopy            MODERATE     
        NRG        

 

 Complete urinalysis with reflex to culture            NO             NRG      
  

 

 Calcium oxalate crystals detection in urine sediment by light microscopy      
      FEW             NRG        









 Stool leukocytes detection by light microscopy - 17 00:58         









 FECAL WBC RESULTS            NO WBC'S OBSERVED ON DIRECT SMEAR             NRG
        

 

 FECAL NOTE            FECAL LEUKOCYTES MAY BE INTERMITTENTLY PRESENT OR       
      NRG        

 

 FECAL NOTE            UNEVENLY DISTRIBUTED IN STOOL SPECIMENS, AND WBC        
     NRG        

 

 FECAL NOTE            MORPHOLOGY DEGRADES DURING TRANSPORT             NRG    
    

 

 FECAL NOTE            NOTE:             NRG        









 C DIFFICILE AG + TOXIN A/B. - 17 00:58         









 RESULTS            NEGATIVE FOR ANTIGEN AND TOXIN A/B             [facility]        









 Stool bacteria identification by culture - 17 00:58         

 

 Complete blood count (CBC) with automated white blood cell (WBC) differential 
- 18 11:04         









 Blood leukocytes automated count (number/volume)            9.9 10*3/uL       
     4.3-11.0        

 

 Blood erythrocytes automated count (number/volume)            4.06 10*6/uL    
        4.35-5.85        

 

 Venous blood hemoglobin measurement (mass/volume)            14.0 g/dL        
    13.3-17.7        

 

 Blood hematocrit (volume fraction)            40 %            40-54        

 

 Automated erythrocyte mean corpuscular volume            100 [foz_us]         
   80-99        

 

 Automated erythrocyte mean corpuscular hemoglobin (mass per erythrocyte)      
      34 pg            25-34        

 

 Automated erythrocyte mean corpuscular hemoglobin concentration measurement (
mass/volume)            35 g/dL            32-36        

 

 Automated erythrocyte distribution width ratio            12.2 %            
10.0-14.5        

 

 Automated blood platelet count (count/volume)            195 10*3/uL          
  130-400        

 

 Automated blood platelet mean volume measurement            11.0 [foz_us]     
       7.4-10.4        

 

 Automated blood neutrophils/100 leukocytes            79 %            42-75   
     

 

 Automated blood lymphocytes/100 leukocytes            12 %            12-44   
     

 

 Blood monocytes/100 leukocytes            6 %            0-12        

 

 Automated blood eosinophils/100 leukocytes            3 %            0-10     
   

 

 Automated blood basophils/100 leukocytes            0 %            0-10        

 

 Blood neutrophils automated count (number/volume)            7.8 10*3         
   1.8-7.8        

 

 Blood lymphocytes automated count (number/volume)            1.2 10*3         
   1.0-4.0        

 

 Blood monocytes automated count (number/volume)            0.6 10*3            
0.0-1.0        

 

 Automated eosinophil count            0.3 10*3/uL            0.0-0.3        

 

 Automated blood basophil count (count/volume)            0.0 10*3/uL          
  0.0-0.1        









 Comprehensive metabolic panel - 18 11:04         









 Serum or plasma sodium measurement (moles/volume)            137 mmol/L       
     135-145        

 

 Serum or plasma potassium measurement (moles/volume)            3.9 mmol/L    
        3.6-5.0        

 

 Serum or plasma chloride measurement (moles/volume)            106 mmol/L     
               

 

 Carbon dioxide            21 mmol/L            21-32        

 

 Serum or plasma anion gap determination (moles/volume)            10 mmol/L   
         5-14        

 

 Serum or plasma urea nitrogen measurement (mass/volume)            29 mg/dL   
         7-18        

 

 Serum or plasma creatinine measurement (mass/volume)            1.10 mg/dL    
        0.60-1.30        

 

 Serum or plasma urea nitrogen/creatinine mass ratio            26             
NRG        

 

 Serum or plasma creatinine measurement with calculation of estimated 
glomerular filtration rate            >             NRG        

 

 Serum or plasma glucose measurement (mass/volume)            272 mg/dL        
            

 

 Serum or plasma calcium measurement (mass/volume)            8.8 mg/dL        
    8.5-10.1        

 

 Serum or plasma total bilirubin measurement (mass/volume)            0.8 mg/dL
            0.1-1.0        

 

 Serum or plasma alkaline phosphatase measurement (enzymatic activity/volume)  
          81 U/L                    

 

 Serum or plasma aspartate aminotransferase measurement (enzymatic activity/
volume)            19 U/L            5-34        

 

 Serum or plasma alanine aminotransferase measurement (enzymatic activity/volume
)            13 U/L            0-55        

 

 Serum or plasma protein measurement (mass/volume)            6.1 g/dL         
   6.4-8.2        

 

 Serum or plasma albumin measurement (mass/volume)            3.7 g/dL         
   3.2-4.5        

 

 CALCIUM CORRECTED            9.0 mg/dL            8.5-10.1        









 Serum or plasma troponin i.cardiac measurement (mass/volume) - 18 11:04 
        









 Serum or plasma troponin i.cardiac measurement (mass/volume)            < ng/
mL            <0.30        



                                    



Encounters

      





 ACCT No.            Visit Date/Time            Discharge            Status    
        Pt. Type            Provider            Facility            Loc./Unit  
          Complaint        

 

 [id number]            2018 10:16:00            2018 14:09:00    
        DIS            Outpatient            VITALY ARITA            [facility]            ER            CONSTIPATED,HERE 
YESTERDAY NO RELIEF        

 

 [id number]            2018 21:55:00            2018 00:50:00    
        DIS            Emergency            JAZMIN OSORIO, ELIECER CARPENTER            
[facility]            ER            ABD PAIN/WAS HERE 
THIS MORNING        

 

 [id number]            2018 10:43:00            2018 14:09:00    
        DIS            Emergency            MIKY RICHARDS MD            [facility]            ER            SHARP ABD PAIN,MOVING 
TOWARDS CHEST,HEADACHE        

 

 [id number]            2017 22:56:00            2017 01:52:00    
        DIS            Emergency            GONZALEZ ECKERT DO            [facility]            ER            DIARRHEA        

 

 [id number]            2016 14:20:00            2016 23:59:59    
        CLS            Outpatient            MAGALY NEELY MD            [facility]            RAD            BILATERAL HIP PAIN DOWN 
LEFT LEG, NUMBNESS        

 

 [id number]            2016 07:34:00            2016 23:59:59    
        CLS            Outpatient            MAGALY NEELY MD            [facility]            RAD            BILATERAL HIP PAIN DOWN 
LEFT LEG,NUMBNESS        

 

 [id number]            2015 10:55:00            2015 23:59:59    
        CLS            Outpatient            MAGALY NEELY MD            [facility]            RAD            TIA        

 

 [id number]            2015 12:07:00            2015 15:52:00    
        DIS            Emergency            ELIECER WU MD            
[facility]            ER            COORDINATION ISSUES  
      

 

 [id number]            10/23/2013 19:00:00            10/24/2013 17:30:00    
        DIS            Inpatient            DOMINGO MASSEY MD            [facility]            SURGICAL            CHOLELITHIASIS;
ABDOMINAL PAIN;NIDDM        

 

 [id number]            10/02/2011 10:05:00                                   
   Document Registration                                                       
     

 

 [id number]            2015 10:55:58                         [facility]            
Document Registration

## 2020-03-09 ENCOUNTER — HOSPITAL ENCOUNTER (OUTPATIENT)
Dept: HOSPITAL 75 - REHAB | Age: 85
Discharge: HOME | End: 2020-03-09
Attending: ORTHOPAEDIC SURGERY
Payer: MEDICARE

## 2020-03-09 DIAGNOSIS — E11.9: ICD-10-CM

## 2020-03-09 DIAGNOSIS — M54.16: Primary | ICD-10-CM

## 2023-03-14 NOTE — PROGRESS NOTE-POST OPERATIVE
Post-Operative Progess Note


Surgeon (s)/Assistant (s)


Surgeon


DOMINGO MASSEY MD


Assistant:  none





Pre-Operative Diagnosis


abd pain, mesenteric adenitis.





Post-Operative Diagnosis





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





Procedure & Operative Findings


Date of Procedure


10/5/18


Procedure Performed/Findings


Colonoscopy


Anesthesia Type


CS





Estimated Blood Loss


Estimated blood loss (mL):  minmal





Specimens/Packing


Specimens Removed


none











DOMINGO MASSEY MD Oct 5, 2018 10:59 am friend/Other